# Patient Record
Sex: MALE | Race: WHITE | NOT HISPANIC OR LATINO | URBAN - METROPOLITAN AREA
[De-identification: names, ages, dates, MRNs, and addresses within clinical notes are randomized per-mention and may not be internally consistent; named-entity substitution may affect disease eponyms.]

---

## 2017-01-02 DIAGNOSIS — F43.10 PTSD (POST-TRAUMATIC STRESS DISORDER): Primary | ICD-10-CM

## 2017-01-02 RX ORDER — ZOLPIDEM TARTRATE 10 MG/1
5-10 TABLET ORAL
Qty: 30 TABLET | Refills: 0 | Status: SHIPPED | OUTPATIENT
Start: 2017-01-02 | End: 2017-01-26

## 2017-01-02 NOTE — TELEPHONE ENCOUNTER
Controlled Substance Refill Request for ambien  Problem List Complete:  No     PROVIDER TO CONSIDER COMPLETION OF PROBLEM LIST AND OVERVIEW/CONTROLLED SUBSTANCE AGREEMENT    Last Written Prescription Date:  12/1/16  Last Fill Quantity: 30,   # refills: 0    Last Office Visit with [facility] primary care provider: 11/10/16    Future Office visit:     Controlled substance agreement on file: No.     Processing:  Staff will hand deliver Rx to on-site pharmacy   checked in past 6 months?  No, route to RN     Jaclyn Britton, Pharmacy [facility]

## 2017-01-26 ENCOUNTER — TELEPHONE (OUTPATIENT)
Dept: FAMILY MEDICINE | Facility: CLINIC | Age: 47
End: 2017-01-26

## 2017-01-26 DIAGNOSIS — F43.10 PTSD (POST-TRAUMATIC STRESS DISORDER): Primary | ICD-10-CM

## 2017-01-26 DIAGNOSIS — M54.50 LEFT-SIDED LOW BACK PAIN WITHOUT SCIATICA, UNSPECIFIED CHRONICITY: Primary | ICD-10-CM

## 2017-01-26 DIAGNOSIS — G47.00 INSOMNIA: ICD-10-CM

## 2017-01-26 RX ORDER — CYCLOBENZAPRINE HCL 10 MG
5-10 TABLET ORAL 3 TIMES DAILY PRN
Qty: 30 TABLET | Refills: 1 | Status: SHIPPED | OUTPATIENT
Start: 2017-01-26 | End: 2017-03-10

## 2017-01-26 RX ORDER — ZOLPIDEM TARTRATE 10 MG/1
5-10 TABLET ORAL AT BEDTIME
Qty: 30 TABLET | Refills: 0 | Status: SHIPPED | OUTPATIENT
Start: 2017-01-26 | End: 2017-01-26

## 2017-01-26 RX ORDER — ZOLPIDEM TARTRATE 10 MG/1
5-10 TABLET ORAL
Qty: 30 TABLET | Refills: 0 | Status: SHIPPED | OUTPATIENT
Start: 2017-01-26 | End: 2017-03-10

## 2017-01-26 NOTE — TELEPHONE ENCOUNTER
Flexeril 10 mg      Last Written Prescription Date:  12/22/2016  Last Fill Quantity: 30,   # refills: 1  Last Office Visit with [facility] prescribing provider: 11/10/2016  Future Office visit:       Routing refill request to provider for review/approval because:  Drug not on the [facility] refill protocol or controlled substance                Thank You,  Rufus Ulloa, [facility]  On behalf of [facility]

## 2017-01-26 NOTE — TELEPHONE ENCOUNTER
Let's try a PA. We can call patient and see if he has tried taking 1/2 tab prn at night. If not, let's hold the PA and have him try this instead. If he has tried this and failed, the reason for the PA would be due to inability to tolerated 5 mg nightly.     Also refilled and in outbox.      -Jamie Oropeza, PAC

## 2017-01-26 NOTE — TELEPHONE ENCOUNTER
We called in PA.   Per insurance PA needed either way.  Pt prefers 1 tab.   PA approved for 6 months. 1/27/17 - 7/26/17  Effective date:  1/27/17 - 7/26/17  On site pharmacy notified. They will call patient.   Favio Brannon RN

## 2017-01-26 NOTE — TELEPHONE ENCOUNTER
Pt's insurance requires a PA for zolpidem; they will only cover qty 15 per 25 days; he is wondering if you would do a PA for one tab per day    PA NEEDED ON: zolpidem  INS IS: [facility]  Bin: [id number]  PHONE # IS: [phone]  ID # IS: [id number]  Group: [id number]  Pcn: [id number]  PLEASE LET US KNOW WHEN PA IS GRANTED/DENIED.  THANK YOU!  Jaclyn Britton, Pharmacy [facility]

## 2017-03-09 DIAGNOSIS — F43.10 PTSD (POST-TRAUMATIC STRESS DISORDER): ICD-10-CM

## 2017-03-09 RX ORDER — ZOLPIDEM TARTRATE 10 MG/1
5-10 TABLET ORAL
Qty: 30 TABLET | Refills: 0 | Status: CANCELLED | OUTPATIENT
Start: 2017-03-09

## 2017-03-10 ENCOUNTER — OFFICE VISIT (OUTPATIENT)
Dept: FAMILY MEDICINE | Facility: CLINIC | Age: 47
End: 2017-03-10
Payer: COMMERCIAL

## 2017-03-10 VITALS
HEART RATE: 88 BPM | HEIGHT: 72 IN | TEMPERATURE: 98.2 F | BODY MASS INDEX: 31.83 KG/M2 | WEIGHT: 235 LBS | RESPIRATION RATE: 16 BRPM | DIASTOLIC BLOOD PRESSURE: 85 MMHG | SYSTOLIC BLOOD PRESSURE: 138 MMHG

## 2017-03-10 DIAGNOSIS — F43.10 PTSD (POST-TRAUMATIC STRESS DISORDER): ICD-10-CM

## 2017-03-10 DIAGNOSIS — Z79.899 CONTROLLED SUBSTANCE AGREEMENT SIGNED: ICD-10-CM

## 2017-03-10 DIAGNOSIS — B08.5 HERPANGINA: Primary | ICD-10-CM

## 2017-03-10 DIAGNOSIS — M54.50 LEFT-SIDED LOW BACK PAIN WITHOUT SCIATICA, UNSPECIFIED CHRONICITY: ICD-10-CM

## 2017-03-10 DIAGNOSIS — B35.1 ONYCHOMYCOSIS: ICD-10-CM

## 2017-03-10 PROCEDURE — 36415 COLL VENOUS BLD VENIPUNCTURE: CPT | Performed by: PHYSICIAN ASSISTANT

## 2017-03-10 PROCEDURE — 80076 HEPATIC FUNCTION PANEL: CPT | Performed by: PHYSICIAN ASSISTANT

## 2017-03-10 PROCEDURE — 99213 OFFICE O/P EST LOW 20 MIN: CPT | Performed by: PHYSICIAN ASSISTANT

## 2017-03-10 RX ORDER — TERBINAFINE HYDROCHLORIDE 250 MG/1
250 TABLET ORAL DAILY
Qty: 90 TABLET | Refills: 0 | Status: SHIPPED | OUTPATIENT
Start: 2017-03-10 | End: 2017-05-15

## 2017-03-10 RX ORDER — ZOLPIDEM TARTRATE 10 MG/1
5-10 TABLET ORAL
Qty: 30 TABLET | Refills: 0 | Status: SHIPPED | OUTPATIENT
Start: 2017-03-10 | End: 2017-05-15

## 2017-03-10 RX ORDER — CYCLOBENZAPRINE HCL 10 MG
5-10 TABLET ORAL 3 TIMES DAILY PRN
Qty: 30 TABLET | Refills: 1 | Status: SHIPPED | OUTPATIENT
Start: 2017-03-10 | End: 2017-05-24

## 2017-03-10 RX ORDER — VALACYCLOVIR HYDROCHLORIDE 1 G/1
2000 TABLET, FILM COATED ORAL 2 TIMES DAILY
Qty: 12 TABLET | Refills: 0 | Status: SHIPPED | OUTPATIENT
Start: 2017-03-10 | End: 2017-05-15

## 2017-03-10 NOTE — LETTER
[facility]    03/10/17    Patient: Shiv Amador  YOB: 1970  Medical Record Number: [id number]                                                                  Controlled Substance Agreement  I understand that my care provider has prescribed controlled substances (narcotics, tranquilizers, and/or stimulants) to help manage my condition(s).  I am taking this medicine to help me function or work.  I know that this is strong medicine.  It could have serious side effects and even cause a dependency on the drug.  If I stop these medicines suddenly, I could have severe withdrawal symptoms.    The risks, benefits, and side effects of these medication(s) were explained to me.  I agree that:  1. I will take part in other treatments as advised by my provider.  This may be psychiatry or counseling, physical therapy, behavioral therapy, group treatment, or a referral to a pain clinic.  I will reduce or stop my medicine when my provider tells me to do so.   2. I will take my medicines as prescribed.  I will not change the dose or schedule unless my provider tells me to.  There will be no refills if I  run out early.   I may be contacted at any time without warning and asked to complete a drug test or pill count.   3. I will keep all my appointments at the clinic.  If I miss appointments or fail to follow instructions, my provider may stop my medicine.  4. I will not ask other providers to prescribe controlled substances. And I will not accept controlled substances from other people. If I need another prescribed controlled substance for a new reason, I will notify my provider within one business day.  5. If I enroll in the [place] Medical Marijuana program, I will tell my provider.  I will also sign an agreement to share my medical records with my provider.  6. I will use one pharmacy to fill all of my controlled substance prescriptions.  If my prescription is mailed to my pharmacy, it may  take 5 to 7 days for my medicine to be ready.  7. I understand that my provider, clinic care team, and pharmacy can track controlled substance prescriptions from other providers through a central database (prescription monitoring program).  8. I will bring in my list of medications (or my medicine bottles) each time I come to the clinic.  REV-  04/2016                                                                                                                                            Page 1 of 2      [facility]    03/10/17    Patient: Shiv Amador  YOB: 1970  Medical Record Number: [id number]    9. Refills of controlled substances will be made only during office hours.  It is up to me to make sure that I do not run out of my medicines on weekends or holidays.    10. I am responsible for my prescriptions.  If the medicine is lost or stolen, it will not be replaced.   I also agree not to share these medicines with anyone.  11. I agree to not use ANY illegal or recreational drugs.  This includes marijuana, cocaine, bath salts or other drugs.  I agree not to use alcohol unless my provider says I may.  I agree to give urine samples whenever asked.  If I fail to give a urine sample, the provider may stop my medicine.     12. I will tell my nurse or provider right away if I become pregnant or have a new medical problem treated outside of [facility].  13. I understand that this medicine can affect my thinking and judgment.  It may be unsafe for me to drive, use machinery and do dangerous tasks.  I will not do any of these things until I know how the medicine affects me.  If my dose changes, I will wait to see how it affects me.  I will contact my provider if I have concerns about medicine side effects.  I understand that if I do not follow any of the conditions above, my prescriptions or treatment may be stopped.    I agree that my provider, clinic care team, and pharmacy may  work with any city, state or federal law enforcement agency that investigates the misuse, sale, or other diversion of my controlled medicine. I will allow my provider to discuss my care with or share a copy of this agreement with any other treating provider, pharmacy or emergency room where I receive care.  I agree to give up (waive) any right of privacy or confidentiality with respect to these authorizations.   I have read this agreement and have asked questions about anything I did not understand.   ___________________________________    ___________________________  Patient Signature                                                           Date and Time  ___________________________________     ____________________________  Witness                                                                            Date and Time  ___________________________________  Tyler Oropeza PA-C  REV-  04/2016                                                                                                                                                                 Page 2 of 2

## 2017-03-10 NOTE — LETTER
[facility]  [address] [place], [address]  [phone]      March 13, 2017    Shiv Amador  [address] [address] [address]          Dear Shiv,    The results of your recent tests were normal.  Enclosed is a copy of the results.  It was a pleasure to see you at your last appointment.    If you have any questions or concerns, please call myself or my nurse at [phone].          Sincerely,    Tyler Oropeza PA-C    Results for orders placed or performed in visit on 03/10/17   Hepatic panel (Albumin, ALT, AST, Bili, Alk Phos, TP)   Result Value Ref Range    Bilirubin Direct 0.2 0.0 - 0.2 mg/dL    Bilirubin Total 0.8 0.2 - 1.3 mg/dL    Albumin 3.5 3.4 - 5.0 g/dL    Protein Total 7.6 6.8 - 8.8 g/dL    Alkaline Phosphatase 49 40 - 150 U/L    ALT 45 0 - 70 U/L    AST 37 0 - 45 U/L     KB

## 2017-03-10 NOTE — MR AVS SNAPSHOT
After Visit Summary   3/10/2017    Shiv Amador    MRN: [id number]           Patient Information     Date Of Birth          1970        Visit Information        Provider Department      3/10/2017 11:15 AM Tyler Oropeza PA-C [facility]        Today's Diagnoses     Herpangina    -  1    Left-sided low back pain without sciatica, unspecified chronicity        PTSD (post-traumatic stress disorder)        Onychomycosis          Care Instructions      Fungal Infection of Nails  Fungal infection of the nails changes the way fingernails and toenails look. They may thicken, discolor, change shape, or split. This condition is hard to treat because nails grow slowly and have limited blood supply. It is common for the infection to come back after treatment.  There are 2 types of medicines used.    Topical anti-fungal medicines are applied to the surface of the skin and nail area. These medicines are not very effective because they cannot get deep into the nail.    Topical medicines are most useful in combination with oral medicines. Oral antifungal medicines work better because they penetrate the nail from the inside out. However, recurrence still occurs and it may take 9 to 12 months to determine if you have been cured or not (i.e., for your nail to look normal again). You can repeat treatment if needed. Medications are well-tolerated and it is uncommon to need to stop therapy due to side effects, but your doctor may perform some monitoring tests. Discuss potential side effects with your doctor before starting treatment.  If medicines fail, the nail can be removed surgically or chemically. This improves the effectiveness of medical treatment because the fungus is physically removed from the body.  Home care  The following will help you care for your infection at home:  1. Use medicines exactly as directed for as long as directed. Treating a fungal infection can take longer  than other kinds of infections.  2. Smoking is a risk factor for fungal infection. This is one more reason to quit.  3. Wear absorbent socks and shoes that let your feet breathe. Sweaty feet increase risk of fungal infection and make an existing infection harder to treat.  4. Use footwear when in damp public places like swimming pools, gyms, and shower rooms. This will help you avoid the fungus that grows there.  5. Don't share nail clippers or scissors with others.  Follow-up care  Follow up with your doctor as directed by our staff.  When to seek medical care  Get prompt medical attention if any of the following occur:    Skin by the nail becomes reddened, swollen, painful, or drains pus    Side effects from oral anti-fungal medicines    3592-6851 [employer]. [address] [place] [address], [place] [address]. All rights reserved. This information is not intended as a substitute for professional medical care. Always follow your healthcare professional's instructions.              Follow-ups after your visit        Who to contact     If you have questions or need follow up information about today's clinic visit or your schedule please contact [facility] directly at [phone].  Normal or non-critical lab and imaging results will be communicated to you by Movatuhart, letter or phone within 4 business days after the clinic has received the results. If you do not hear from us within 7 days, please contact the clinic through Movat[employer]art or phone. If you have a critical or abnormal lab result, we will notify you by phone as soon as possible.  Submit refill requests through TextHog or call your pharmacy and they will forward the refill request to us. Please allow 3 business days for your refill to be completed.          Additional Information About Your Visit        MovatuharSmarp Information     TextHog lets you send messages to your doctor, view your test results, renew your prescriptions, schedule  "appointments and more. To sign up, go to [email or web address] . Click on \"Log in\" on the left side of the screen, which will take you to the Welcome page. Then click on \"Sign up Now\" on the right side of the page.     You will be asked to enter the access code listed below, as well as some personal information. Please follow the directions to create your username and password.     Your access code is: [email or web address]  Expires: 2017 11:41 AM     Your access code will  in 90 days. If you need help or a new code, please call your [place] clinic or [phone].        Care EveryWhere ID     This is your Care EveryWhere ID. This could be used by other organizations to access your [place] medical records  [id number]        Your Vitals Were     Pulse Temperature Respirations Height BMI (Body Mass Index)       88 98.2  F (36.8  C) (Oral) 16 6' (1.829 m) 31.87 kg/m2        Blood Pressure from Last 3 Encounters:   03/10/17 138/85   11/10/16 123/71   16 132/71    Weight from Last 3 Encounters:   03/10/17 235 lb (106.6 kg)   11/10/16 232 lb (105.2 kg)   16 223 lb (101.2 kg)              We Performed the Following     Hepatic panel (Albumin, ALT, AST, Bili, Alk Phos, TP)          Today's Medication Changes          These changes are accurate as of: 3/10/17 11:41 AM.  If you have any questions, ask your nurse or doctor.               Start taking these medicines.        Dose/Directions    terbinafine 250 MG tablet   Commonly known as:  lamISIL   Used for:  Onychomycosis   Started by:  Tyler Oropeza PA-C        Dose:  250 mg   Take 1 tablet (250 mg) by mouth daily   Quantity:  90 tablet   Refills:  0       valACYclovir 1000 mg tablet   Commonly known as:  VALTREX   Used for:  Herpangina   Started by:  Tyler Oropeza PA-C        Dose:  2000 mg   Take 2 tablets (2,000 mg) by mouth 2 times daily for 3 days   Quantity:  12 tablet   Refills:  0            Where to get your medicines    "   These medications were sent to [facility], [place] - [address]  [address] [facility] [address]     Phone:  [phone]     cyclobenzaprine 10 MG tablet    terbinafine 250 MG tablet    valACYclovir 1000 mg tablet         Some of these will need a paper prescription and others can be bought over the counter.  Ask your nurse if you have questions.     Bring a paper prescription for each of these medications     zolpidem 10 MG tablet                Primary Care Provider Office Phone # Fax #    Tyler Cyrus Oropeza PA-C [phone]       [facility] [address] [facility] [address]        Thank you!     Thank you for choosing [facility]  for your care. Our goal is always to provide you with excellent care. Hearing back from our patients is one way we can continue to improve our services. Please take a few minutes to complete the written survey that you may receive in the mail after your visit with us. Thank you!             Your Updated Medication List - Protect others around you: Learn how to safely use, store and throw away your medicines at www.disposemymeds.org.          This list is accurate as of: 3/10/17 11:41 AM.  Always use your most recent med list.                   Brand Name Dispense Instructions for use    cyclobenzaprine 10 MG tablet    FLEXERIL    30 tablet    Take 0.5-1 tablets (5-10 mg) by mouth 3 times daily as needed for muscle spasms       finasteride 1 MG tablet    PROPECIA    90 tablet    Take 1 tablet (1 mg) by mouth daily       terbinafine 250 MG tablet    lamISIL    90 tablet    Take 1 tablet (250 mg) by mouth daily       valACYclovir 1000 mg tablet    VALTREX    12 tablet    Take 2 tablets (2,000 mg) by mouth 2 times daily for 3 days       zolpidem 10 MG tablet    AMBIEN    30 tablet    Take 0.5-1 tablets (5-10 mg) by mouth nightly as needed for sleep

## 2017-03-10 NOTE — NURSING NOTE
Chief Complaint   Patient presents with     Derm Problem       Initial /85 (BP Location: Right arm, Patient Position: Chair, Cuff Size: Adult Large)  Pulse 88  Temp 98.2  F (36.8  C) (Oral)  Resp 16  Ht 6' (1.829 m)  Wt 235 lb (106.6 kg)  BMI 31.87 kg/m2 Estimated body mass index is 31.87 kg/(m^2) as calculated from the following:    Height as of this encounter: 6' (1.829 m).    Weight as of this encounter: 235 lb (106.6 kg).  Medication Reconciliation: complete

## 2017-03-10 NOTE — PROGRESS NOTES
SUBJECTIVE:                                                    Shiv Amador is a 46 year old male who presents to clinic today for the following health issues:      Rash     Onset: couple days    Description:   Location: lower lip  Character: raised, redness  Itching (Pruritis): no     Progression of Symptoms:  worsening    Accompanying Signs & Symptoms:  Fever: no   Body aches or joint pain: no   Sore throat symptoms: no   Recent cold symptoms: recently got over cold   History:   Previous similar rash: no     Precipitating factors:   Exposure to similar rash: no   New exposures: None   Recent travel: no     -possible fungas from toenail infection which has gotten worse. Attempted ciclopirox, without improvement. Has taken po lamisil in past without side effects, but did not complete course.      Therapies Tried and outcome: vaseline        Of note, patient also requesting refill of Ambien and flexeril. Both tolerated well without side effects.     Problem list and histories reviewed & adjusted, as indicated.  Additional history: as documented    Patient Active Problem List   Diagnosis     Adjustment disorder with mixed anxiety and depressed mood     Obstructive sleep apnea     Sciatica     CARDIOVASCULAR SCREENING; LDL GOAL LESS THAN 160     Elevated LFTs     PTSD (post-traumatic stress disorder)     Past Surgical History   Procedure Laterality Date     Hc knee scope, w/lateral release         Social History   Substance Use Topics     Smoking status: Never Smoker     Smokeless tobacco: Never Used      Comment: 2-5 cig every couple     Alcohol use No     Family History   Problem Relation Age of Onset     HEART DISEASE Father      heart attack  age 67     Respiratory Father      emphyzema     Family History Negative Sister      Family History Negative Sister      Family History Negative Brother          Current Outpatient Prescriptions   Medication Sig Dispense Refill     valACYclovir (VALTREX) 1000 mg  tablet Take 2 tablets (2,000 mg) by mouth 2 times daily for 3 days 12 tablet 0     cyclobenzaprine (FLEXERIL) 10 MG tablet Take 0.5-1 tablets (5-10 mg) by mouth 3 times daily as needed for muscle spasms 30 tablet 1     zolpidem (AMBIEN) 10 MG tablet Take 0.5-1 tablets (5-10 mg) by mouth nightly as needed for sleep 30 tablet 0     terbinafine (LAMISIL) 250 MG tablet Take 1 tablet (250 mg) by mouth daily 90 tablet 0     finasteride (PROPECIA) 1 MG tablet Take 1 tablet (1 mg) by mouth daily 90 tablet 1     No Known Allergies  BP Readings from Last 3 Encounters:   03/10/17 138/85   11/10/16 123/71   11/04/16 132/71    Wt Readings from Last 3 Encounters:   03/10/17 235 lb (106.6 kg)   11/10/16 232 lb (105.2 kg)   11/04/16 223 lb (101.2 kg)                    Reviewed and updated as needed this visit by clinical staff  Tobacco  Allergies  Meds  Problems  Med Hx  Surg Hx  Fam Hx  Soc Hx        Reviewed and updated as needed this visit by Provider  Allergies  Meds  Problems         ROS:  Constitutional, HEENT, skin,  Psych, neuro, cardiovascular, pulmonary, gi and gu systems are negative, except as otherwise noted.    OBJECTIVE:                                                    /85 (BP Location: Right arm, Patient Position: Chair, Cuff Size: Adult Large)  Pulse 88  Temp 98.2  F (36.8  C) (Oral)  Resp 16  Ht 6' (1.829 m)  Wt 235 lb (106.6 kg)  BMI 31.87 kg/m2  Body mass index is 31.87 kg/(m^2).  GENERAL: healthy, alert and no distress  HENT: normal cephalic/atraumatic, oropharynx clear, oral mucous membranes moist and ~4 papular lesion noted on bottom lip. No drainage or erythema. No tenderness to palpation.   SKIN: severe onychomycosis noted on left great toe with significant flaking.   PSYCH: mentation appears normal, affect normal/bright    Diagnostic Test Results:  none      ASSESSMENT/PLAN:                                                      (B08.5) Herpangina  (primary encounter diagnosis)  Comment:  evident on exam. Felt likely secondary to decreased immune system from recent illness. Will tx with antivirals given worsening course. If no improvement in 3 days, patient to RTC. Sooner if worsening.   Plan: valACYclovir (VALTREX) 1000 mg tablet            (B35.1) Onychomycosis  Comment: evident on exam. Failed ciclopirox. Will attempt PO once more given this not being completed last time. LFTs have been mildly elevated in past. Will recheck these as well. Follow up in 3 months prn. If no improvement, will have see podiatry.   Plan: terbinafine (LAMISIL) 250 MG tablet, Hepatic         panel (Albumin, ALT, AST, Bili, Alk Phos, TP)        -Medication use and side effects discussed with the patient. Patient is in complete understanding and agreement with plan.       (M54.5) Left-sided low back pain without sciatica, unspecified chronicity  Comment: stable   Plan: cyclobenzaprine (FLEXERIL) 10 MG tablet            (F43.10) PTSD (post-traumatic stress disorder)  Comment: stable. Refilled today. Updated problem list and obtained signed agreement.   Plan: zolpidem (AMBIEN) 10 MG tablet        -Medication use and side effects discussed with the patient. Patient is in complete understanding and agreement with plan.         Follow up: as above     Tyler Oropeza PA-C  [facility]

## 2017-03-10 NOTE — PATIENT INSTRUCTIONS
Fungal Infection of Nails  Fungal infection of the nails changes the way fingernails and toenails look. They may thicken, discolor, change shape, or split. This condition is hard to treat because nails grow slowly and have limited blood supply. It is common for the infection to come back after treatment.  There are 2 types of medicines used.    Topical anti-fungal medicines are applied to the surface of the skin and nail area. These medicines are not very effective because they cannot get deep into the nail.    Topical medicines are most useful in combination with oral medicines. Oral antifungal medicines work better because they penetrate the nail from the inside out. However, recurrence still occurs and it may take 9 to 12 months to determine if you have been cured or not (i.e., for your nail to look normal again). You can repeat treatment if needed. Medications are well-tolerated and it is uncommon to need to stop therapy due to side effects, but your doctor may perform some monitoring tests. Discuss potential side effects with your doctor before starting treatment.  If medicines fail, the nail can be removed surgically or chemically. This improves the effectiveness of medical treatment because the fungus is physically removed from the body.  Home care  The following will help you care for your infection at home:  1. Use medicines exactly as directed for as long as directed. Treating a fungal infection can take longer than other kinds of infections.  2. Smoking is a risk factor for fungal infection. This is one more reason to quit.  3. Wear absorbent socks and shoes that let your feet breathe. Sweaty feet increase risk of fungal infection and make an existing infection harder to treat.  4. Use footwear when in damp public places like swimming pools, gyms, and shower rooms. This will help you avoid the fungus that grows there.  5. Don't share nail clippers or scissors with others.  Follow-up care  Follow up with  your doctor as directed by our staff.  When to seek medical care  Get prompt medical attention if any of the following occur:    Skin by the nail becomes reddened, swollen, painful, or drains pus    Side effects from oral anti-fungal medicines    0925-6816 [employer]. [address] [place] [address], [place] [address]. All rights reserved. This information is not intended as a substitute for professional medical care. Always follow your healthcare professional's instructions.

## 2017-03-10 NOTE — TELEPHONE ENCOUNTER
Controlled Substance Refill Request for ambien  Problem List Complete:  No     PROVIDER TO CONSIDER COMPLETION OF PROBLEM LIST AND OVERVIEW/CONTROLLED SUBSTANCE AGREEMENT    Last Written Prescription Date:  1/27/17  Last Fill Quantity: 30,   # refills: 0    Last Office Visit with [facility] care provider: 11/10/16    Future Office visit:   Next 5 appointments (look out 90 days)     Mar 10, 2017 11:15 AM CST   SHORT with Tyler Oropeza PA-C   [facility]    [address] [place] [id number] [phone]                  Controlled substance agreement on file: Yes:  Date 8/21/2013.     Processing:  Staff will hand deliver Rx to on-site pharmacy   checked in past 6 months?  No, route to RN     Jaclyn Britton, Pharmacy [facility]

## 2017-03-11 LAB
ALBUMIN SERPL-MCNC: 3.5 G/DL (ref 3.4–5)
ALP SERPL-CCNC: 49 U/L (ref 40–150)
ALT SERPL W P-5'-P-CCNC: 45 U/L (ref 0–70)
AST SERPL W P-5'-P-CCNC: 37 U/L (ref 0–45)
BILIRUB DIRECT SERPL-MCNC: 0.2 MG/DL (ref 0–0.2)
BILIRUB SERPL-MCNC: 0.8 MG/DL (ref 0.2–1.3)
PROT SERPL-MCNC: 7.6 G/DL (ref 6.8–8.8)

## 2017-05-01 ENCOUNTER — TELEPHONE (OUTPATIENT)
Dept: FAMILY MEDICINE | Facility: CLINIC | Age: 47
End: 2017-05-01

## 2017-05-01 DIAGNOSIS — F43.23 ADJUSTMENT DISORDER WITH MIXED ANXIETY AND DEPRESSED MOOD: Primary | ICD-10-CM

## 2017-05-01 NOTE — TELEPHONE ENCOUNTER
Reason for call: Other      [facility] Officer [facility] Officer   is requesting assistance from Jamie Oropeza regarding   patients use of certain medications.    Additional comments: Please have Jamie review documents from [facility]   and fill out anything necessary.    Phone Number Pt can be reached at: Other phone number:  [phone]  Best Time: Anytime  Can we leave a detailed message on this number? YES

## 2017-05-02 NOTE — TELEPHONE ENCOUNTER
I am unsure what they are asking? Do they just need the med list? If this is it, it looks fine. Also I see patient sees a psychiatrist for which he obtains other medications listed on letter. [facility] may need to obtain notes from them and also our medlist will need to be updated and a call to the patient to do so can be done.     Thanks,    Jamie Oropeza, PAC

## 2017-05-10 NOTE — TELEPHONE ENCOUNTER
Please send letter to patient and close encounter due to multiple attempts.     -Jamie Oropeza, PAC

## 2017-05-11 ENCOUNTER — TELEPHONE (OUTPATIENT)
Dept: FAMILY MEDICINE | Facility: CLINIC | Age: 47
End: 2017-05-11

## 2017-05-11 NOTE — TELEPHONE ENCOUNTER
Patient had previous prescription of gabapentin from Dr. Charles Carlson that is no longer on his med list in [place].  Patient requested a new prescription for this.  If approved, please send a new prescription for gabapentin.  Unsure of dosage.    Thanks,  Richelle Schilling Mariza  [facility]  [phone]

## 2017-05-11 NOTE — TELEPHONE ENCOUNTER
I left a message for Zulma with [facility] stating Delta needs to contact Shiv guillen before the records can be released.    The signatures do not match.  Form Back at the [facility]

## 2017-05-11 NOTE — TELEPHONE ENCOUNTER
We have been attempting to contact patient for multiple days about med list. He has not returned our calls. I believe Dr. Wilcox is his psychiatrist. He is also obtaining other medications through him. Please call to verify med list and also refill request should go to his psychiatrist.     Thanks,    Jamie Oropeza, PAC

## 2017-05-12 NOTE — TELEPHONE ENCOUNTER
Pt returned call, updated correct contact number    Reviewed medication list with pt  [facility], [facility]  Had rx for Lamisol, but admits doesn't take it as he doesn't find it effective  Has used Valtrex in past if needed    States he gets other meds from psychiatrist which include Gabapentin, Adderall, Clonazepam    Route to provider to review and advise    Makenna RN Nurse Triage

## 2017-05-15 DIAGNOSIS — F43.10 PTSD (POST-TRAUMATIC STRESS DISORDER): ICD-10-CM

## 2017-05-15 RX ORDER — DEXTROAMPHETAMINE SACCHARATE, AMPHETAMINE ASPARTATE, DEXTROAMPHETAMINE SULFATE AND AMPHETAMINE SULFATE 5; 5; 5; 5 MG/1; MG/1; MG/1; MG/1
20 TABLET ORAL DAILY
Refills: 0 | COMMUNITY
Start: 2017-05-15 | End: 2018-08-24

## 2017-05-15 NOTE — TELEPHONE ENCOUNTER
Controlled Substance Refill Request for Ambien  Problem List Complete:  Yes    Controlled Substance Refill Request for Ambien     Last refill: 3/10/17    Last clinic visit: 3/10/17     Clinic visit frequency required: Q 6  months  Next appt: TBD    Controlled substance agreement on file: Yes:  Date 3/10/17.    Documentation in problem list reviewed:  Yes    Processing:  Staff will hand deliver Rx to on-site pharmacy    RX monitoring [place] ([place] reviewed:  reviewed- no concerns  [place]:  [email or web address]       checked in past 6 months?  No, route to RN Overdue    Jaclyn Britton, Pharmacy [facility]

## 2017-05-15 NOTE — TELEPHONE ENCOUNTER
Called and updated med list in pts chart. Patient did not know some of the doses for the meds.  Patient states he did sign CIERRA for us to send records to [facility] and gave another verbal ok for us to send med list and problem list to them.   CIERRA and letter sent to abstracting.  Letter, med list and problem list faxed to [place].

## 2017-05-15 NOTE — TELEPHONE ENCOUNTER
Called and spoke with patient. Patient states he did sign CIERRA for us to send records to [facility].   Patient gave another verbal ok to send med list and problem list to them. CIERRA and letter sent to abstract.

## 2017-05-17 RX ORDER — ZOLPIDEM TARTRATE 10 MG/1
5-10 TABLET ORAL
Qty: 30 TABLET | Refills: 0 | Status: SHIPPED | OUTPATIENT
Start: 2017-05-17 | End: 2017-12-14

## 2017-05-24 DIAGNOSIS — M54.50 LEFT-SIDED LOW BACK PAIN WITHOUT SCIATICA, UNSPECIFIED CHRONICITY: ICD-10-CM

## 2017-05-25 NOTE — TELEPHONE ENCOUNTER
CYCLOBENZAPRINE HCL 10MG TABS        Last Written Prescription Date: 03-  Last Fill Quantity: 05-,  # 30refills: 1   Last Office Visit with [facility], [facility] or [facility] prescribing provider: 03- Tyler Oropeza.

## 2017-05-26 RX ORDER — CYCLOBENZAPRINE HCL 10 MG
TABLET ORAL
Qty: 30 TABLET | Refills: 1 | Status: SHIPPED | OUTPATIENT
Start: 2017-05-26 | End: 2017-07-11

## 2017-07-03 DIAGNOSIS — L65.9 ALOPECIA: ICD-10-CM

## 2017-07-03 NOTE — TELEPHONE ENCOUNTER
finasteride (PROPECIA) 1 MG tablet    Last Written Prescription Date: 11/04/2016  Last Fill Quantity: 90,05/11/2017  # refills: 1   Last Office Visit with [facility], [facility] or [facility] prescribing provider: 03/10/2017-Jamie Oropeza

## 2017-07-05 NOTE — TELEPHONE ENCOUNTER
Patient called and states he is out of his medication how soon will this be completed? Please call patient back.    Vicky Almaguer Pat. Rep

## 2017-07-06 ENCOUNTER — OFFICE VISIT (OUTPATIENT)
Dept: FAMILY MEDICINE | Facility: CLINIC | Age: 47
End: 2017-07-06
Payer: COMMERCIAL

## 2017-07-06 VITALS
RESPIRATION RATE: 12 BRPM | DIASTOLIC BLOOD PRESSURE: 74 MMHG | OXYGEN SATURATION: 97 % | TEMPERATURE: 98 F | WEIGHT: 212.7 LBS | HEART RATE: 102 BPM | BODY MASS INDEX: 28.81 KG/M2 | HEIGHT: 72 IN | SYSTOLIC BLOOD PRESSURE: 114 MMHG

## 2017-07-06 DIAGNOSIS — M54.50 ACUTE MIDLINE LOW BACK PAIN WITHOUT SCIATICA: Primary | ICD-10-CM

## 2017-07-06 PROCEDURE — 99214 OFFICE O/P EST MOD 30 MIN: CPT | Performed by: FAMILY MEDICINE

## 2017-07-06 RX ORDER — FINASTERIDE 1 MG/1
1 TABLET, FILM COATED ORAL DAILY
Qty: 30 TABLET | Refills: 5 | Status: SHIPPED | OUTPATIENT
Start: 2017-07-06 | End: 2018-08-24

## 2017-07-06 NOTE — NURSING NOTE
Chief Complaint   Patient presents with     Back Pain       Initial /74 (BP Location: Left arm, Patient Position: Standing, Cuff Size: Adult Large)  Pulse 102  Temp 98  F (36.7  C) (Oral)  Resp 12  Ht 6' (1.829 m)  Wt 212 lb 11.2 oz (96.5 kg)  SpO2 97%  BMI 28.85 kg/m2 Estimated body mass index is 28.85 kg/(m^2) as calculated from the following:    Height as of this encounter: 6' (1.829 m).    Weight as of this encounter: 212 lb 11.2 oz (96.5 kg).  Medication Reconciliation: complete   Diego Sims CMA

## 2017-07-06 NOTE — PROGRESS NOTES
SUBJECTIVE:                                                    Shiv Amador is a 46 year old male who presents to clinic today for the following health issues:      Back Pain       Duration: 1 hour ago        Specific cause: lifting    Description:   Location of pain: low back both and middle of back both  Character of pain: sharp and constant  Pain radiation:none  New numbness or weakness in legs, not attributed to pain:  no     Intensity: Currently 8/10    History:   Pain interferes with job: YES  History of back problems: previous herniated disc  Any previous MRI or X-rays: None  Sees a specialist for back pain:  No  Therapies tried without relief: nothing    Alleviating factors:   Improved by: rest      Precipitating factors:  Worsened by: Bending    Functional and Psychosocial Screen (Dulce STarT Back):      Not performed today          Accompanying Signs & Symptoms:  Risk of Fracture:  Recent history of trauma or blunt force  Risk of Cauda Equina:  None  Risk of Infection:  None  Risk of Cancer:  None  Risk of Ankylosing Spondylitis:  Onset at age <35, male, AND morning back stiffness. no               Acute midline low back pain without sciatica  (primary encounter diagnosis): Patient states the injury occurred an hour and a half ago (about 1 PM) when he was squatting 350 lbs at the gym. He describes it as pain in the middle back. He has a history of back pain including sciatica and previous back injuries, first Dx in our record > 10 years ago. On prn flexeril     Problem list and histories reviewed & adjusted, as indicated.  Additional history: as documented        Reviewed and updated as needed this visit by clinical staff        Past Medical History:   Diagnosis Date     JOAN (obstructive sleep apnea)      PTSD (post-traumatic stress disorder)      Recurrent low back pain        Past Surgical History:   Procedure Laterality Date     HC KNEE SCOPE, W/LATERAL RELEASE         Family History   Problem  Relation Age of Onset     HEART DISEASE Father      heart attack  age 67     Respiratory Father      emphyzema     Family History Negative Sister      Family History Negative Sister      Family History Negative Brother        Social History   Substance Use Topics     Smoking status: Never Smoker     Smokeless tobacco: Never Used      Comment: 2-5 cig every couple     Alcohol use No       Reviewed and updated as needed this visit by Provider         ROS:  POSITIVE for Lower back pain. Will not bend. CAn walk. No GI symptoms, fall    This document serves as a record of the services and decisions personally performed and made by Dk Britt MD. It was created on his behalf by Cami Trent, a trained medical scribe.  The creation of this document is based on the scribe's personal observations and the provider's statements to the medical scribe.  Cami Trent, 2017 2:31 PM    OBJECTIVE:     /74 (BP Location: Left arm, Patient Position: Standing, Cuff Size: Adult Large)  Pulse 102  Temp 98  F (36.7  C) (Oral)  Resp 12  Ht 1.829 m (6')  Wt 96.5 kg (212 lb 11.2 oz)  SpO2 97%  BMI 28.85 kg/m2  Body mass index is 28.85 kg/(m^2).    EXAM:  GENERAL: Prominent pain behaviors  MS: Unable to stand on toes, refused to touch toes, gait symmetric  ponderous      ASSESSMENT/PLAN:     ASSESSMENT / PLAN:  (M54.5) Acute midline low back pain without sciatica  (primary encounter diagnosis)  Comment: Refer to medical spinal specialist.  Weight lifting is extreme, History is long, narcotics in past  Plan: ORTHO  REFERRAL, nabumetone (RELAFEN)         750 MG tablet                      The information in this document, created by the medical scribe for me, accurately reflects the services I personally performed and the decisions made by me. I have reviewed and approved this document for accuracy prior to leaving the patient care area.  Dk Britt MD 2017 2:31 PM    Dk Britt MD  [place]  [facility]

## 2017-07-06 NOTE — MR AVS SNAPSHOT
After Visit Summary   7/6/2017    Shiv Amador    MRN: [id number]           Patient Information     Date Of Birth          1970        Visit Information        Provider Department      7/6/2017 2:15 PM Dk Britt MD [facility]        Today's Diagnoses     Acute midline low back pain without sciatica    -  1       Follow-ups after your visit        Additional Services     ORTHO  REFERRAL       [facility] is referring you to the Orthopedic  Services at [facility].       The  Representative will assist you in the coordination of your Orthopedic and Musculoskeletal Care as prescribed by your physician.    The  Representative will call you within 1 business day to help schedule your appointment, or you may contact the  Representative at:    All areas ~ [phone]     Type of Referral : Spine: Lumbar  **Choose Medical Spine Specialist (unless patient was seen by a Medical Spine Specialist within the past 6 months).**  Surgical Evaluation is advised if the patient presents with one or more of the following red flags: Evidence of Spinal Tumor, Infection or Fracture, Cauda Equina Syndrome, Sudden or Progressive Weakness, Loss of Bowel or Bladder Control, or any other documented emergent neurological condition resulting from a Lumbar Spinal Condition. Medical Spine Specialist        Timeframe requested: 1 - 2 days    Coverage of these services is subject to the terms and limitations of your health insurance plan.  Please call member services at your health plan with any benefit or coverage questions.      If X-rays, CT or MRI's have been performed, please contact the facility where they were done to arrange for , prior to your scheduled appointment.  Please bring this referral request to your appointment and present it to your specialist.                  Your next 10 appointments  "already scheduled     2017  2:00 PM CDT   New Visit with VIBHA Ward CNP   [facility] ([facility])    [address] [place] [id number] [phone]              Who to contact     If you have questions or need follow up information about today's clinic visit or your schedule please contact [facility] directly at [phone].  Normal or non-critical lab and imaging results will be communicated to you by MyChart, letter or phone within 4 business days after the clinic has received the results. If you do not hear from us within 7 days, please contact the clinic through MyChart or phone. If you have a critical or abnormal lab result, we will notify you by phone as soon as possible.  Submit refill requests through [employer] or call your pharmacy and they will forward the refill request to us. Please allow 3 business days for your refill to be completed.          Additional Information About Your Visit        MyCharTwijector Information     [employer] lets you send messages to your doctor, view your test results, renew your prescriptions, schedule appointments and more. To sign up, go to [email or web address] . Click on \"Log in\" on the left side of the screen, which will take you to the Welcome page. Then click on \"Sign up Now\" on the right side of the page.     You will be asked to enter the access code listed below, as well as some personal information. Please follow the directions to create your username and password.     Your access code is: [email or web address]  Expires: 10/4/2017  2:43 PM     Your access code will  in 90 days. If you need help or a new code, please call your [facility] clinic or [phone].        Care EveryWhere ID     This is your Care EveryWhere ID. This could be used by other organizations to access your [facility] medical records  [id number]        Your Vitals Were     Pulse Temperature " Respirations Height Pulse Oximetry BMI (Body Mass Index)    102 98  F (36.7  C) (Oral) 12 6' (1.829 m) 97% 28.85 kg/m2       Blood Pressure from Last 3 Encounters:   07/06/17 114/74   03/10/17 138/85   11/10/16 123/71    Weight from Last 3 Encounters:   07/06/17 212 lb 11.2 oz (96.5 kg)   03/10/17 235 lb (106.6 kg)   11/10/16 232 lb (105.2 kg)              We Performed the Following     ORTHO  REFERRAL          Today's Medication Changes          These changes are accurate as of: 7/6/17  8:59 PM.  If you have any questions, ask your nurse or doctor.               Start taking these medicines.        Dose/Directions    nabumetone 750 MG tablet   Commonly known as:  RELAFEN   Used for:  Acute midline low back pain without sciatica   Started by:  Dk Britt MD        Dose:  750 mg   Take 1 tablet (750 mg) by mouth 2 times daily   Quantity:  60 tablet   Refills:  1            Where to get your medicines      These medications were sent to [facility] [place] [address]     Phone:  [phone]     nabumetone 750 MG tablet                Primary Care Provider Office Phone # Fax #    Tyler Cyrus Oropeza PA-C [phone]       [place] [id number]        Equal Access to Services     MONA CASTELLANOS AH: Hadii aad ku hadasho Soomaali, waaxda luqadaha, qaybta kaalmada adeegyada, waxay marisol chance adesofi luz. So [facility] [phone].    ATENCIÓN: Si habla español, tiene a gaona disposición servicios gratuitos de asistencia lingüística. Maeve al [phone].    We comply with applicable federal civil rights laws and [place] laws. We do not discriminate on the basis of race, color, national origin, age, disability sex, sexual orientation or gender identity.            Thank you!     Thank you for choosing [facility]  for your care. Our goal is always to  provide you with excellent care. Hearing back from our patients is one way we can continue to improve our services. Please take a few minutes to complete the written survey that you may receive in the mail after your visit with us. Thank you!             Your Updated Medication List - Protect others around you: Learn how to safely use, store and throw away your medicines at www.disposemymeds.org.          This list is accurate as of: 7/6/17  8:59 PM.  Always use your most recent med list.                   Brand Name Dispense Instructions for use Diagnosis    ADDERALL 20 MG per tablet   Generic drug:  amphetamine-dextroamphetamine      Take 1 tablet (20 mg) by mouth 2 times daily        CLONAZEPAM PO      Dose unknown        cyclobenzaprine 10 MG tablet    FLEXERIL    30 tablet    TAKE ONE-HALF TO ONE TABLET BY MOUTH THREE TIMES A DAY AS NEEDED FOR MUSCLE SPASMS    Left-sided low back pain without sciatica, unspecified chronicity       finasteride 1 MG tablet    PROPECIA    30 tablet    Take 1 tablet (1 mg) by mouth daily    Alopecia       GABAPENTIN PO      Dose unknown        nabumetone 750 MG tablet    RELAFEN    60 tablet    Take 1 tablet (750 mg) by mouth 2 times daily    Acute midline low back pain without sciatica       SEROQUEL PO      Dose unknown.        zolpidem 10 MG tablet    AMBIEN    30 tablet    Take 0.5-1 tablets (5-10 mg) by mouth nightly as needed for sleep    PTSD (post-traumatic stress disorder)

## 2017-07-07 ENCOUNTER — TELEPHONE (OUTPATIENT)
Dept: FAMILY MEDICINE | Facility: CLINIC | Age: 47
End: 2017-07-07

## 2017-07-07 NOTE — TELEPHONE ENCOUNTER
1 page form ([facility] Continuity of Care Request form) signed/completed/dated and faxed to [phone]. Copy sent to abstract.    Evelyn Thakkar/

## 2017-07-11 DIAGNOSIS — Z79.899 CONTROLLED SUBSTANCE AGREEMENT SIGNED: ICD-10-CM

## 2017-07-11 DIAGNOSIS — M54.50 LEFT-SIDED LOW BACK PAIN WITHOUT SCIATICA, UNSPECIFIED CHRONICITY: ICD-10-CM

## 2017-07-11 NOTE — TELEPHONE ENCOUNTER
CYCLOBENZAPRINE HCL 10MG TABS     Last Written Prescription Date: 05-  Last Fill Quantity: 06-,  #30 refills: 1   Last Office Visit with [facility], P or [facility] prescribing provider: 07- Dr. Britt.

## 2017-07-12 RX ORDER — CYCLOBENZAPRINE HCL 10 MG
TABLET ORAL
Qty: 30 TABLET | Refills: 1 | Status: SHIPPED | OUTPATIENT
Start: 2017-07-12 | End: 2017-09-02

## 2017-07-12 NOTE — TELEPHONE ENCOUNTER
Routing refill request to provider for review/approval because:  Drug not on the [facility] refill protocol   Controlled Substance Refill Request for Ambien     Last refill: 3/10/17    Last clinic visit: 3/10/17     Clinic visit frequency required: Q 6  months  Next appt: TBD    Controlled substance agreement on file: Yes:  Date 3/10/17.    Documentation in problem list reviewed:  Yes    Processing:  Staff will hand deliver Rx to on-site pharmacy    RX [place] program [place]) reviewed:  reviewed 7.12.17, controlled meds from 3 providers, report on desk  [place]MP profile:  [email or web address]    Zoë Diehl RN, BS  Clinical Nurse Triage.

## 2017-07-21 ENCOUNTER — TELEPHONE (OUTPATIENT)
Dept: FAMILY MEDICINE | Facility: CLINIC | Age: 47
End: 2017-07-21

## 2017-07-21 ENCOUNTER — OFFICE VISIT (OUTPATIENT)
Dept: FAMILY MEDICINE | Facility: CLINIC | Age: 47
End: 2017-07-21
Payer: COMMERCIAL

## 2017-07-21 VITALS
SYSTOLIC BLOOD PRESSURE: 118 MMHG | WEIGHT: 214 LBS | BODY MASS INDEX: 29.02 KG/M2 | TEMPERATURE: 98.9 F | DIASTOLIC BLOOD PRESSURE: 73 MMHG | HEART RATE: 71 BPM

## 2017-07-21 DIAGNOSIS — J03.90 TONSILLITIS: ICD-10-CM

## 2017-07-21 DIAGNOSIS — J34.2 DEVIATED NASAL SEPTUM: ICD-10-CM

## 2017-07-21 DIAGNOSIS — Z01.818 PREOP GENERAL PHYSICAL EXAM: Primary | ICD-10-CM

## 2017-07-21 LAB
DEPRECATED S PYO AG THROAT QL EIA: NORMAL
HGB BLD-MCNC: 13.9 G/DL (ref 13.3–17.7)
MICRO REPORT STATUS: NORMAL
SPECIMEN SOURCE: NORMAL

## 2017-07-21 PROCEDURE — 99214 OFFICE O/P EST MOD 30 MIN: CPT | Performed by: PHYSICIAN ASSISTANT

## 2017-07-21 PROCEDURE — 36415 COLL VENOUS BLD VENIPUNCTURE: CPT | Performed by: PHYSICIAN ASSISTANT

## 2017-07-21 PROCEDURE — 87880 STREP A ASSAY W/OPTIC: CPT | Performed by: PHYSICIAN ASSISTANT

## 2017-07-21 PROCEDURE — 85018 HEMOGLOBIN: CPT | Performed by: PHYSICIAN ASSISTANT

## 2017-07-21 PROCEDURE — 87081 CULTURE SCREEN ONLY: CPT | Performed by: PHYSICIAN ASSISTANT

## 2017-07-21 NOTE — PROGRESS NOTES
[facility]  [address] [address]  [phone]  Dept: [phone]    PRE-OP EVALUATION:  Today's date: 2017    Shiv Amador (: 1970) presents for pre-operative evaluation assessment as requested by unknown.  He requires evaluation and anesthesia risk assessment prior to undergoing surgery/procedure for treatment of  .  Proposed procedure: septoplasty    Date of Surgery/ Procedure: 17  Time of Surgery/ Procedure: 730  Hospital/Surgical Facility: [facility]  Fax number for surgical facility: [phone]   Primary Physician: Tyler Oropeza  Type of Anesthesia Anticipated: to be determined    Patient has a Health Care Directive or Living Will:  NO    1. NO - Do you have a history of heart attack, stroke, stent, bypass or surgery on an artery in the head, neck, heart or legs?  2. NO - Do you ever have any pain or discomfort in your chest?  3. NO - Do you have a history of  Heart Failure?  4. NO - Are you troubled by shortness of breath when: walking on the level, up a slight hill or at night?  5. NO - Do you currently have a cold, bronchitis or other respiratory infection?  6. NO - Do you have a cough, shortness of breath or wheezing?  7. NO - Do you sometimes get pains in the calves of your legs when you walk?  8. NO - Do you or anyone in your family have previous history of blood clots?  9. NO - Do you or does anyone in your family have a serious bleeding problem such as prolonged bleeding following surgeries or cuts?  10. NO - Have you ever had problems with anemia or been told to take iron pills?  11. NO - Have you had any abnormal blood loss such as black, tarry or bloody stools, or abnormal vaginal bleeding?  12. NO - Have you ever had a blood transfusion?  13. NO - Have you or any of your relatives ever had problems with anesthesia?  14. YES - DO YOU HAVE SLEEP APNEA, EXCESSIVE SNORING OR DAYTIME DROWSINESS? Sleep apnea  and daytime drowsiness  15. NO - Do you have any prosthetic heart valves?  16. NO - Do you have prosthetic joints?  17. NO - Is there any chance that you may be pregnant?        HPI:                                                      Brief HPI related to upcoming procedure: history of septal deviation resulting in JOAN. The above procedure was deemed the next best step in management.       See problem list for active medical problems.  Problems all longstanding and stable, except as noted/documented.  See ROS for pertinent symptoms related to these conditions.                                                                                                  .    MEDICAL HISTORY:                                                    Patient Active Problem List    Diagnosis Date Noted     Deviated nasal septum 07/21/2017     Priority: Medium     Acute midline low back pain without sciatica 07/06/2017     Priority: Medium     Controlled substance agreement signed 03/10/2017     Priority: Medium     Controlled Substance Refill Request for Ambien     Last refill: 3/10/17    Last clinic visit: 3/10/17     Clinic visit frequency required: Q 6  months  Next appt: TBD    Controlled substance agreement on file: Yes:  Date 3/10/17.    Documentation in problem list reviewed:  Yes    Processing:  Staff will hand deliver Rx to on-site pharmacy    RX monitoring program ([place]MP) reviewed:  reviewed 7.12.17, controlled meds from 3 providers  [place] profile:  [email or web address]           PTSD (post-traumatic stress disorder) 04/15/2016     Priority: Medium     Elevated LFTs 08/05/2011     Priority: Medium     CARDIOVASCULAR SCREENING; LDL GOAL LESS THAN 160 10/31/2010     Priority: Medium     Sciatica 05/22/2007     Priority: Medium     Adjustment disorder with mixed anxiety and depressed mood 03/12/2007     Priority: Medium     Obstructive sleep apnea 03/12/2007     Priority: Medium     Problem list name updated by automated  process. Provider to review        Past Medical History:   Diagnosis Date     Acute midline low back pain without sciatica 7/6/2017     Adjustment disorder with mixed anxiety and depressed mood 3/12/2007     Elevated LFTs 8/5/2011     JOAN (obstructive sleep apnea)      PTSD (post-traumatic stress disorder)      Recurrent low back pain      Past Surgical History:   Procedure Laterality Date     HC KNEE SCOPE, W/LATERAL RELEASE       Current Outpatient Prescriptions   Medication Sig Dispense Refill     cyclobenzaprine (FLEXERIL) 10 MG tablet TAKE ONE-HALF TO ONE TABLET BY MOUTH THREE TIMES A DAY AS NEEDED FOR MUSCLE SPASMS 30 tablet 1     finasteride (PROPECIA) 1 MG tablet Take 1 tablet (1 mg) by mouth daily 30 tablet 5     nabumetone (RELAFEN) 750 MG tablet Take 1 tablet (750 mg) by mouth 2 times daily 60 tablet 1     zolpidem (AMBIEN) 10 MG tablet Take 0.5-1 tablets (5-10 mg) by mouth nightly as needed for sleep 30 tablet 0     amphetamine-dextroamphetamine (ADDERALL) 20 MG per tablet Take 1 tablet (20 mg) by mouth 2 times daily  0     GABAPENTIN PO Dose unknown       CLONAZEPAM PO Dose unknown       QUEtiapine Fumarate (SEROQUEL PO) Dose unknown.       OTC products: no recent use of OTC ASA, NSAIDS or Steroids    No Known Allergies   Latex Allergy: NO    Social History   Substance Use Topics     Smoking status: Never Smoker     Smokeless tobacco: Never Used      Comment: 2-5 cig every couple     Alcohol use No     History   Drug Use No       REVIEW OF SYSTEMS:                                                    C: NEGATIVE for fever, chills, change in weight  I: NEGATIVE for worrisome rashes, moles or lesions  E: NEGATIVE for vision changes or irritation  E/M: mild dry throat. Otherwise, NEGATIVE for ear, mouth and throat problems  R: NEGATIVE for significant cough or SOB  B: NEGATIVE for masses, tenderness or discharge  CV: NEGATIVE for chest pain, palpitations or peripheral edema  GI: NEGATIVE for nausea,  abdominal pain, heartburn, or change in bowel habits  : NEGATIVE for frequency, dysuria, or hematuria  M: NEGATIVE for significant arthralgias or myalgia  N: NEGATIVE for weakness, dizziness or paresthesias  E: NEGATIVE for temperature intolerance, skin/hair changes  H: NEGATIVE for bleeding problems  P: NEGATIVE for changes in mood or affect    EXAM:                                                    /73 (BP Location: Right arm, Patient Position: Chair, Cuff Size: Adult Large)  Pulse 71  Temp 98.9  F (37.2  C) (Oral)  Wt 214 lb (97.1 kg)  BMI 29.02 kg/m2    GENERAL APPEARANCE: healthy, alert and no distress     EYES: EOMI,  PERRL     HENT: Erythematous oropharynx with 1+ tonsillitis. Otherwise, ear canals and TM's normal and nose and mouth without ulcers or lesions     NECK: no adenopathy, no asymmetry, masses, or scars and thyroid normal to palpation     RESP: lungs clear to auscultation - no rales, rhonchi or wheezes     CV: regular rates and rhythm, normal S1 S2, no S3 or S4 and no murmur, click or rub     ABDOMEN:  soft, nontender, no HSM or masses and bowel sounds normal     MS: extremities normal- no gross deformities noted, no evidence of inflammation in joints, FROM in all extremities.     SKIN: no suspicious lesions or rashes     NEURO: Normal strength and tone, sensory exam grossly normal, mentation intact and speech normal     PSYCH: mentation appears normal. and affect normal/bright     LYMPHATICS: normal ant/post cervical, supraclavicular nodes    DIAGNOSTICS:                                                      EKG: Not indicated due to non-vascular surgery and low risk of event (age <65 and without cardiac risk factors)  Labs Resulted Today:   Results for orders placed or performed in visit on 07/21/17   Hemoglobin   Result Value Ref Range    Hemoglobin 13.9 13.3 - 17.7 g/dL   Rapid strep screen   Result Value Ref Range    Specimen Description Throat     Rapid Strep A Screen        NEGATIVE: No Group A streptococcal antigen detected by immunoassay, await   culture report.      Micro Report Status FINAL 07/21/2017        Recent Labs   Lab Test  11/10/16   1322  04/25/16   0819  07/12/11   0828   HGB  13.6  13.1*  13.8   PLT   --   460*  328   NA   --   137  137   POTASSIUM   --   4.9  4.3   CR   --   1.19  1.17   A1C   --    --   4.9        IMPRESSION:                                                    Reason for surgery/procedure: Deviated septum  Diagnosis/reason for consult: preoperative exam for the above procedure.     The proposed surgical procedure is considered INTERMEDIATE risk.    REVISED CARDIAC RISK INDEX  The patient has the following serious cardiovascular risks for perioperative complications such as (MI, PE, VFib and 3  AV Block):  No serious cardiac risks  INTERPRETATION: 0 risks: Class I (very low risk - 0.4% complication rate)    The patient has the following additional risks for perioperative complications:  No identified additional risks      ICD-10-CM    1. Preop general physical exam Z01.818 Hemoglobin   2. Deviated nasal septum J34.2    3. Tonsillitis J03.90 Rapid strep screen     Beta strep group A culture       RECOMMENDATIONS:                                                      NPO after midnight. No alcohol 24 hours prior. No nsaids until after procedure. Tylenol prn for pain. Patient to hold all meds morning of procedure.       APPROVAL GIVEN to proceed with proposed procedure, without further diagnostic evaluation       Signed Electronically by: Tyler Oropeza PA-C    Copy of this evaluation report is provided to requesting physician.    [facility] Preop Guidelines

## 2017-07-21 NOTE — NURSING NOTE
Chief Complaint   Patient presents with     Pre-Op Exam       Initial /73 (BP Location: Right arm, Patient Position: Chair, Cuff Size: Adult Large)  Pulse 71  Temp 98.9  F (37.2  C) (Oral)  Wt 214 lb (97.1 kg)  BMI 29.02 kg/m2 Estimated body mass index is 29.02 kg/(m^2) as calculated from the following:    Height as of 7/6/17: 6' (1.829 m).    Weight as of this encounter: 214 lb (97.1 kg).  Medication Reconciliation: complete        CHARLEE Kwok

## 2017-07-21 NOTE — MR AVS SNAPSHOT
After Visit Summary   7/21/2017    Shiv Amador    MRN: [id number]           Patient Information     Date Of Birth          1970        Visit Information        Provider Department      7/21/2017 11:30 AM Tyler Oropeza PA-C [facility]        Today's Diagnoses     Preop general physical exam    -  1    Deviated nasal septum        Tonsillitis          Care Instructions      Before Your Surgery      Call your surgeon if there is any change in your health. This includes signs of a cold or flu (such as a sore throat, runny nose, cough, rash or fever).    Do not smoke, drink alcohol or take over the counter medicine (unless your surgeon or primary care doctor tells you to) for the 24 hours before and after surgery.    If you take prescribed drugs: Follow your doctor s orders about which medicines to take and which to stop until after surgery.    Eating and drinking prior to surgery: follow the instructions from your surgeon    Take a shower or bath the night before surgery. Use the soap your surgeon gave you to gently clean your skin. If you do not have soap from your surgeon, use your regular soap. Do not shave or scrub the surgery site.  Wear clean pajamas and have clean sheets on your bed.           Follow-ups after your visit        Who to contact     If you have questions or need follow up information about today's clinic visit or your schedule please contact [facility] directly at [phone].  Normal or non-critical lab and imaging results will be communicated to you by MyChart, letter or phone within 4 business days after the clinic has received the results. If you do not hear from us within 7 days, please contact the clinic through MyChart or phone. If you have a critical or abnormal lab result, we will notify you by phone as soon as possible.  Submit refill requests through [employer] or call your pharmacy and they will forward the refill  "request to us. Please allow 3 business days for your refill to be completed.          Additional Information About Your Visit        MyChart Information     [employer] lets you send messages to your doctor, view your test results, renew your prescriptions, schedule appointments and more. To sign up, go to [email or web address] . Click on \"Log in\" on the left side of the screen, which will take you to the Welcome page. Then click on \"Sign up Now\" on the right side of the page.     You will be asked to enter the access code listed below, as well as some personal information. Please follow the directions to create your username and password.     Your access code is: [email or web address]  Expires: 10/4/2017  2:43 PM     Your access code will  in 90 days. If you need help or a new code, please call your [place] [facility] clinic or [phone].        Care EveryWhere ID     This is your Care EveryWhere ID. This could be used by other organizations to access your [facility] medical records  [id number]        Your Vitals Were     Pulse Temperature BMI (Body Mass Index)             71 98.9  F (37.2  C) (Oral) 29.02 kg/m2          Blood Pressure from Last 3 Encounters:   17 118/73   17 114/74   03/10/17 138/85    Weight from Last 3 Encounters:   17 214 lb (97.1 kg)   17 212 lb 11.2 oz (96.5 kg)   03/10/17 235 lb (106.6 kg)              We Performed the Following     Hemoglobin     Rapid strep screen        Primary Care Provider Office Phone # Fax #    Tyler Cyrus Oropeza PA-C [phone]       [address] [address]        Equal Access to Services     MONA CASTELLANOS : Fidelia Agrawal, guilherme tukcer, emilio kaalmaalan boyce, marisa luz. So [facility] [phone].    ATENCIÓN: Si habla español, tiene a gaona disposición servicios gratuitos de asistencia lingüística. Llame al [phone].    We comply with " applicable federal civil rights laws and [place] laws. We do not discriminate on the basis of race, color, national origin, age, disability sex, sexual orientation or gender identity.            Thank you!     Thank you for choosing [facility]  for your care. Our goal is always to provide you with excellent care. Hearing back from our patients is one way we can continue to improve our services. Please take a few minutes to complete the written survey that you may receive in the mail after your visit with us. Thank you!             Your Updated Medication List - Protect others around you: Learn how to safely use, store and throw away your medicines at www.disposemymeds.org.          This list is accurate as of: 7/21/17 12:10 PM.  Always use your most recent med list.                   Brand Name Dispense Instructions for use Diagnosis    ADDERALL 20 MG per tablet   Generic drug:  amphetamine-dextroamphetamine      Take 1 tablet (20 mg) by mouth 2 times daily        CLONAZEPAM PO      Dose unknown        cyclobenzaprine 10 MG tablet    FLEXERIL    30 tablet    TAKE ONE-HALF TO ONE TABLET BY MOUTH THREE TIMES A DAY AS NEEDED FOR MUSCLE SPASMS    Left-sided low back pain without sciatica, unspecified chronicity       finasteride 1 MG tablet    PROPECIA    30 tablet    Take 1 tablet (1 mg) by mouth daily    Alopecia       GABAPENTIN PO      Dose unknown        nabumetone 750 MG tablet    RELAFEN    60 tablet    Take 1 tablet (750 mg) by mouth 2 times daily    Acute midline low back pain without sciatica       SEROQUEL PO      Dose unknown.        zolpidem 10 MG tablet    AMBIEN    30 tablet    Take 0.5-1 tablets (5-10 mg) by mouth nightly as needed for sleep    PTSD (post-traumatic stress disorder)

## 2017-07-22 LAB
BACTERIA SPEC CULT: NORMAL
MICRO REPORT STATUS: NORMAL
SPECIMEN SOURCE: NORMAL

## 2017-09-02 DIAGNOSIS — M54.50 LEFT-SIDED LOW BACK PAIN WITHOUT SCIATICA, UNSPECIFIED CHRONICITY: ICD-10-CM

## 2017-09-02 NOTE — TELEPHONE ENCOUNTER
Pending Prescriptions:                       Disp   Refills    cyclobenzaprine (FLEXERIL) 10 MG tablet [*30 tab*1            Sig: TAKE ONE-HALF TO ONE TABLET BY MOUTH THREE TIMES           A DAY AS NEEDED FOR MUSCLE SPASMS            Last Written Prescription Date:  7/12/2017  Last Fill Quantity: 30,   # refills: 1  Last Office Visit with [facility], [facility] or [facility] prescribing provider: 7/21/2017Sandip    Future Office visit:       Routing refill request to provider for review/approval because:  Drug not on the [facility], [facility] or  [facility] refill protocol or controlled substance

## 2017-09-06 NOTE — TELEPHONE ENCOUNTER
Pt reports he did not see ortho, did not need to. Takes flexeril after work outs. Has taken it for years.   Please advise.   Favio Brannon RN

## 2017-09-06 NOTE — TELEPHONE ENCOUNTER
Routing refill request to provider for review/approval because:  Drug not on the [facility] refill protocol   Harmony Cope RN, BSN

## 2017-09-06 NOTE — TELEPHONE ENCOUNTER
Can we call patient. Was referred to ortho in July. Has patient followed up with them? Flexeril while it is a relatively safe medication is not meant for long term use.     -Jamie Oropeza, PAC

## 2017-09-07 RX ORDER — CYCLOBENZAPRINE HCL 10 MG
TABLET ORAL
Qty: 30 TABLET | Refills: 1 | Status: SHIPPED | OUTPATIENT
Start: 2017-09-07 | End: 2017-10-09

## 2017-10-09 DIAGNOSIS — M54.50 LEFT-SIDED LOW BACK PAIN WITHOUT SCIATICA, UNSPECIFIED CHRONICITY: ICD-10-CM

## 2017-10-09 NOTE — TELEPHONE ENCOUNTER
Pending Prescriptions:                       Disp   Refills    cyclobenzaprine (FLEXERIL) 10 MG tablet [*30 tab*1            Sig: TAKE ONE-HALF TO ONE TABLET BY MOUTH THREE TIMES           A DAY AS NEEDED FOR MUSCLE SPASMS              Last Written Prescription Date:  9/7/2017  Last Fill Quantity: 30,   # refills: 1  Last Office Visit with [facility], [facility] or [facility] prescribing provider: 7/21/2017Sandip    Future Office visit:       Routing refill request to provider for review/approval because:  Drug not on the [facility], [facility] or  [facility] refill protocol or controlled substance

## 2017-10-12 RX ORDER — CYCLOBENZAPRINE HCL 10 MG
5-10 TABLET ORAL DAILY PRN
Qty: 30 TABLET | Refills: 2 | Status: SHIPPED | OUTPATIENT
Start: 2017-10-12 | End: 2018-08-24

## 2017-10-12 NOTE — TELEPHONE ENCOUNTER
This was just given last month 30 with a refill. He should not need a refill this quickly if taking it as informed last month:     Favio Brannon RN        1:12 PM   Note      Pt reports he did not see ortho, did not need to. Takes flexeril after work outs. Has taken it for years.   Please advise.   Favio Brannon, RN               Can we call and see if this is even needed?    -Jamie arreaga PAC

## 2017-10-12 NOTE — TELEPHONE ENCOUNTER
Please call patient. See patient's response from last refill. He states he takes this after workouts. Patient should not be taking this more than once daily. Though flexeril cannot be abused, it does have some slight addicting proprieties. Refill with new sig sent.     -Jamie Oropeza, PAC

## 2017-10-12 NOTE — TELEPHONE ENCOUNTER
Routing refill request to provider for review/approval because:  Drug not on the [facility] refill protocol     RX monitoring program ([place]MP) reviewed:  reviewed- recommend provider review    [place]MP profile:  [email or web address]-[email or web address] IQ (Social Influence Quotient)/    Mila RUEDA RN, BSN, PHN  Nevaeh Beckett RN

## 2017-12-14 DIAGNOSIS — F43.10 PTSD (POST-TRAUMATIC STRESS DISORDER): ICD-10-CM

## 2017-12-14 DIAGNOSIS — Z79.899 CONTROLLED SUBSTANCE AGREEMENT SIGNED: ICD-10-CM

## 2017-12-14 RX ORDER — ZOLPIDEM TARTRATE 10 MG/1
5-10 TABLET ORAL
Qty: 30 TABLET | Refills: 0 | Status: SHIPPED | OUTPATIENT
Start: 2017-12-14 | End: 2017-12-19

## 2017-12-14 NOTE — LETTER
December 19, 2017      Shiv Lewisgerald  [address] [address] [address]        Dear ,    My nurse has been unable to reach you by telephone for a message regarding a prescription for zolpidem. Please call the clinic at the number listed above and reference a refill message in your chart dated 12/14/17.      Sincerely,    Tyler Oropeza PA-C

## 2017-12-26 ENCOUNTER — TELEPHONE (OUTPATIENT)
Dept: FAMILY MEDICINE | Facility: CLINIC | Age: 47
End: 2017-12-26

## 2017-12-26 DIAGNOSIS — F43.10 PTSD (POST-TRAUMATIC STRESS DISORDER): ICD-10-CM

## 2017-12-26 NOTE — TELEPHONE ENCOUNTER
Pt returning calls from 12/14/17 refill encounter. Requests refill zolpidem.  Shiv was having phone problems so did not receive our messages until today.  Now has working phone.   Aware we shredded zolpidem because no call back.   We asked him reason psych is prescribing clonazepam? Replied PTSD.    He confirmed does not take for sleep. He is aware clonazepam is not recommend to take within 12 hours of ambien due to potential interaction of respiratory depression.   Jamie,  OK to re-prescribe?    We will call Shiv tomorrow when Rx sent  Favio Brannon RN

## 2017-12-26 NOTE — TELEPHONE ENCOUNTER
Will assess tomorrow and via PDMP. If prescribed twice daily, I will not be prescribing ambien why he is taking this.     -Jamie Oropeza, PAC

## 2017-12-27 RX ORDER — ZOLPIDEM TARTRATE 10 MG/1
5-10 TABLET ORAL
Qty: 30 TABLET | Refills: 0 | Status: SHIPPED | OUTPATIENT
Start: 2017-12-27 | End: 2018-08-24

## 2017-12-27 NOTE — TELEPHONE ENCOUNTER
Pt reports takes clonazepam once daily in AM either before I get to work or when I get there. Usual work day starts 7:30 AM - 8:00 AM   Favio Brannon RN

## 2017-12-27 NOTE — TELEPHONE ENCOUNTER
pdmp reviewed. Appears clonazepam is prescribed 30 tabs a day. Please call and inquire when and how this is taken.     Thanks,    Jamie Oropeza, PAC

## 2018-04-18 ENCOUNTER — APPOINTMENT (OUTPATIENT)
Dept: MRI IMAGING | Facility: CLINIC | Age: 48
End: 2018-04-18
Payer: COMMERCIAL

## 2018-04-18 ENCOUNTER — HOSPITAL ENCOUNTER (EMERGENCY)
Facility: CLINIC | Age: 48
Discharge: HOME | End: 2018-04-18
Attending: EMERGENCY MEDICINE | Admitting: EMERGENCY MEDICINE
Payer: COMMERCIAL

## 2018-04-18 VITALS
TEMPERATURE: 98.8 F | SYSTOLIC BLOOD PRESSURE: 123 MMHG | HEART RATE: 97 BPM | RESPIRATION RATE: 16 BRPM | OXYGEN SATURATION: 98 % | DIASTOLIC BLOOD PRESSURE: 72 MMHG

## 2018-04-18 DIAGNOSIS — R42 VERTIGO: ICD-10-CM

## 2018-04-18 LAB
ALBUMIN SERPL-MCNC: 2.6 G/DL (ref 3.4–5)
ALP SERPL-CCNC: 61 U/L (ref 40–150)
ALT SERPL W P-5'-P-CCNC: 253 U/L (ref 0–70)
AMPHETAMINES UR QL SCN: POSITIVE
ANION GAP SERPL CALCULATED.3IONS-SCNC: 8 MMOL/L (ref 3–14)
AST SERPL W P-5'-P-CCNC: 148 U/L (ref 0–45)
BARBITURATES UR QL: NEGATIVE
BASOPHILS # BLD AUTO: 0 10E9/L (ref 0–0.2)
BASOPHILS NFR BLD AUTO: 0.2 %
BENZODIAZ UR QL: NEGATIVE
BILIRUB SERPL-MCNC: 0.5 MG/DL (ref 0.2–1.3)
BUN SERPL-MCNC: 6 MG/DL (ref 7–30)
CALCIUM SERPL-MCNC: 7.6 MG/DL (ref 8.5–10.1)
CANNABINOIDS UR QL SCN: POSITIVE
CHLORIDE SERPL-SCNC: 106 MMOL/L (ref 94–109)
CO2 SERPL-SCNC: 25 MMOL/L (ref 20–32)
COCAINE UR QL: NEGATIVE
CREAT SERPL-MCNC: 0.88 MG/DL (ref 0.66–1.25)
DIFFERENTIAL METHOD BLD: ABNORMAL
EOSINOPHIL # BLD AUTO: 0.1 10E9/L (ref 0–0.7)
EOSINOPHIL NFR BLD AUTO: 1.2 %
ERYTHROCYTE [DISTWIDTH] IN BLOOD BY AUTOMATED COUNT: 14 % (ref 10–15)
ETHANOL UR QL SCN: NEGATIVE
GFR SERPL CREATININE-BSD FRML MDRD: >90 ML/MIN/1.7M2
GLUCOSE SERPL-MCNC: 120 MG/DL (ref 70–99)
HCT VFR BLD AUTO: 42.9 % (ref 40–53)
HGB BLD-MCNC: 14.2 G/DL (ref 13.3–17.7)
IMM GRANULOCYTES # BLD: 0 10E9/L (ref 0–0.4)
IMM GRANULOCYTES NFR BLD: 0.2 %
LYMPHOCYTES # BLD AUTO: 1.6 10E9/L (ref 0.8–5.3)
LYMPHOCYTES NFR BLD AUTO: 13.5 %
MAGNESIUM SERPL-MCNC: 1.9 MG/DL (ref 1.6–2.3)
MCH RBC QN AUTO: 30.1 PG (ref 26.5–33)
MCHC RBC AUTO-ENTMCNC: 33.1 G/DL (ref 31.5–36.5)
MCV RBC AUTO: 91 FL (ref 78–100)
MONOCYTES # BLD AUTO: 1.1 10E9/L (ref 0–1.3)
MONOCYTES NFR BLD AUTO: 9.2 %
NEUTROPHILS # BLD AUTO: 8.7 10E9/L (ref 1.6–8.3)
NEUTROPHILS NFR BLD AUTO: 75.7 %
NRBC # BLD AUTO: 0 10*3/UL
NRBC BLD AUTO-RTO: 0 /100
OPIATES UR QL SCN: NEGATIVE
PHOSPHATE SERPL-MCNC: 1.9 MG/DL (ref 2.5–4.5)
PLATELET # BLD AUTO: 416 10E9/L (ref 150–450)
POTASSIUM SERPL-SCNC: 3.8 MMOL/L (ref 3.4–5.3)
PROT SERPL-MCNC: 7 G/DL (ref 6.8–8.8)
RBC # BLD AUTO: 4.72 10E12/L (ref 4.4–5.9)
SODIUM SERPL-SCNC: 139 MMOL/L (ref 133–144)
WBC # BLD AUTO: 11.5 10E9/L (ref 4–11)

## 2018-04-18 PROCEDURE — 83735 ASSAY OF MAGNESIUM: CPT | Performed by: FAMILY MEDICINE

## 2018-04-18 PROCEDURE — 80307 DRUG TEST PRSMV CHEM ANLYZR: CPT | Performed by: FAMILY MEDICINE

## 2018-04-18 PROCEDURE — A9585 GADOBUTROL INJECTION: HCPCS | Performed by: EMERGENCY MEDICINE

## 2018-04-18 PROCEDURE — 99285 EMERGENCY DEPT VISIT HI MDM: CPT | Mod: 25 | Performed by: EMERGENCY MEDICINE

## 2018-04-18 PROCEDURE — 80320 DRUG SCREEN QUANTALCOHOLS: CPT | Performed by: FAMILY MEDICINE

## 2018-04-18 PROCEDURE — 99284 EMERGENCY DEPT VISIT MOD MDM: CPT | Mod: Z6 | Performed by: EMERGENCY MEDICINE

## 2018-04-18 PROCEDURE — 25000128 H RX IP 250 OP 636: Performed by: EMERGENCY MEDICINE

## 2018-04-18 PROCEDURE — 70553 MRI BRAIN STEM W/O & W/DYE: CPT

## 2018-04-18 PROCEDURE — 80053 COMPREHEN METABOLIC PANEL: CPT | Performed by: FAMILY MEDICINE

## 2018-04-18 PROCEDURE — 85025 COMPLETE CBC W/AUTO DIFF WBC: CPT | Performed by: FAMILY MEDICINE

## 2018-04-18 PROCEDURE — 25000131 ZZH RX MED GY IP 250 OP 636 PS 637: Performed by: FAMILY MEDICINE

## 2018-04-18 PROCEDURE — 84100 ASSAY OF PHOSPHORUS: CPT | Performed by: FAMILY MEDICINE

## 2018-04-18 RX ORDER — MECLIZINE HYDROCHLORIDE 25 MG/1
25 TABLET ORAL EVERY 6 HOURS PRN
Qty: 30 TABLET | Refills: 1 | Status: SHIPPED | OUTPATIENT
Start: 2018-04-18 | End: 2018-08-24

## 2018-04-18 RX ORDER — CLONAZEPAM 2 MG/1
2 TABLET ORAL AT BEDTIME
COMMUNITY
End: 2018-08-24

## 2018-04-18 RX ORDER — DEXTROAMPHETAMINE SACCHARATE, AMPHETAMINE ASPARTATE MONOHYDRATE, DEXTROAMPHETAMINE SULFATE AND AMPHETAMINE SULFATE 5; 5; 5; 5 MG/1; MG/1; MG/1; MG/1
20 CAPSULE, EXTENDED RELEASE ORAL DAILY
COMMUNITY
End: 2018-08-24

## 2018-04-18 RX ORDER — MECLIZINE HYDROCHLORIDE 25 MG/1
25 TABLET ORAL ONCE
Status: COMPLETED | OUTPATIENT
Start: 2018-04-18 | End: 2018-04-18

## 2018-04-18 RX ORDER — GADOBUTROL 604.72 MG/ML
10 INJECTION INTRAVENOUS ONCE
Status: COMPLETED | OUTPATIENT
Start: 2018-04-18 | End: 2018-04-18

## 2018-04-18 RX ORDER — GABAPENTIN 300 MG/1
300 CAPSULE ORAL 3 TIMES DAILY
COMMUNITY
End: 2018-08-24

## 2018-04-18 RX ADMIN — MECLIZINE HYDROCHLORIDE 25 MG: 25 TABLET ORAL at 14:57

## 2018-04-18 RX ADMIN — MECLIZINE HYDROCHLORIDE 25 MG: 25 TABLET ORAL at 17:46

## 2018-04-18 RX ADMIN — GADOBUTROL 10 ML: 604.72 INJECTION INTRAVENOUS at 15:18

## 2018-04-18 NOTE — ED AVS SNAPSHOT
[facility], [place], Emergency Department    [address]    [facility] [address]    Phone:  [phone]    Fax:  [phone]                                       Shiv Amador   MRN: [id number]    Department:  [facility], Emergency Department   Date of Visit:  4/18/2018           After Visit Summary Signature Page     I have received my discharge instructions, and my questions have been answered. I have discussed any challenges I see with this plan with the nurse or doctor.    ..........................................................................................................................................  Patient/Patient Representative Signature      ..........................................................................................................................................  Patient Representative Print Name and Relationship to Patient    ..................................................               ................................................  Date                                            Time    ..........................................................................................................................................  Reviewed by Signature/Title    ...................................................              ..............................................  Date                                                            Time

## 2018-04-18 NOTE — DISCHARGE INSTRUCTIONS
TODAY'S VISIT:  You were seen today for dizziness, suspect vertigo.  Liver enzymes were elevated on arrival.  Follow-up with your primary care provider regarding this.  -     FOLLOW-UP:  Please make an appointment to follow up with:  - Your Primary Care Provider as soon as possible.    PRESCRIPTIONS / MEDICATIONS:  -Meclizine.  This is to treat vertigo.  Okay to discontinue use if this does not help your symptoms.    OTHER INSTRUCTIONS:  -     RETURN TO THE EMERGENCY DEPARTMENT  Return to the Emergency Department at any time for new/worsening symptoms.

## 2018-04-18 NOTE — ED TRIAGE NOTES
Patient stopped taking Adderal and Klonipin two weeks ago because he ran out of medications and needs to establish care with a new psychiatrist. Experiencing severe dizziness, nausea, and numbness/tingling to bilateral hands. Feels restless and out of sorts.

## 2018-04-18 NOTE — ED AVS SNAPSHOT
[facility], Emergency Department    [address]    [place] [address]    Phone:  [phone]    Fax:  [phone]                                       Shiv Amador   MRN: [id number]    Department:  [facility], Emergency Department   Date of Visit:  4/18/2018           Patient Information     Date Of Birth          1970        Your diagnoses for this visit were:     Vertigo        You were seen by Leonardo Tay MD.        Discharge Instructions       TODAY'S VISIT:  You were seen today for dizziness, suspect vertigo.  Liver enzymes were elevated on arrival.  Follow-up with your primary care provider regarding this.  -     FOLLOW-UP:  Please make an appointment to follow up with:  - Your Primary Care Provider as soon as possible.    PRESCRIPTIONS / MEDICATIONS:  -Meclizine.  This is to treat vertigo.  Okay to discontinue use if this does not help your symptoms.    OTHER INSTRUCTIONS:  -     RETURN TO THE EMERGENCY DEPARTMENT  Return to the Emergency Department at any time for new/worsening symptoms.       24 Hour Appointment Hotline       To make an appointment at any [facility] clinic, call [phone]). If you don't have a family doctor or clinic, we will help you find one. [facility] clinics are conveniently located to serve the needs of you and your family.             Review of your medicines      START taking        Dose / Directions Last dose taken    meclizine 25 MG tablet   Commonly known as:  ANTIVERT   Dose:  25 mg   Quantity:  30 tablet        Take 1 tablet (25 mg) by mouth every 6 hours as needed for dizziness   Refills:  1          Our records show that you are taking the medicines listed below. If these are incorrect, please call your family doctor or clinic.        Dose / Directions Last dose taken    * ADDERALL XR 20 MG per 24 hr capsule   Dose:  20 mg   Generic drug:  amphetamine-dextroamphetamine        Take 20 mg by mouth daily   Refills:  0         * ADDERALL 20 MG per tablet   Dose:  20 mg   Generic drug:  amphetamine-dextroamphetamine        Take 20 mg by mouth daily   Refills:  0        clonazePAM 2 MG tablet   Commonly known as:  klonoPIN   Dose:  2 mg        Take 2 mg by mouth At Bedtime   Refills:  0        cyclobenzaprine 10 MG tablet   Commonly known as:  FLEXERIL   Dose:  5-10 mg   Quantity:  30 tablet        Take 0.5-1 tablets (5-10 mg) by mouth daily as needed for muscle spasms   Refills:  2        finasteride 1 MG tablet   Commonly known as:  PROPECIA   Dose:  1 tablet   Quantity:  30 tablet        Take 1 tablet (1 mg) by mouth daily   Refills:  5        gabapentin 300 MG capsule   Commonly known as:  NEURONTIN   Dose:  300 mg        Take 300 mg by mouth 3 times daily   Refills:  0        nabumetone 750 MG tablet   Commonly known as:  RELAFEN   Dose:  750 mg   Quantity:  60 tablet        Take 1 tablet (750 mg) by mouth 2 times daily   Refills:  1        SEROQUEL PO        Dose unknown.   Refills:  0        zolpidem 10 MG tablet   Commonly known as:  AMBIEN   Dose:  5-10 mg   Quantity:  30 tablet        Take 0.5-1 tablets (5-10 mg) by mouth nightly as needed for sleep   Refills:  0        * Notice:  This list has 2 medication(s) that are the same as other medications prescribed for you. Read the directions carefully, and ask your doctor or other care provider to review them with you.            Prescriptions were sent or printed at these locations (1 Prescription)                   Other Prescriptions                Printed at Department/Unit printer (1 of 1)         meclizine (ANTIVERT) 25 MG tablet                Procedures and tests performed during your visit     CBC with platelets differential    Comprehensive metabolic panel    Drug abuse screen 6 urine (tox)    MR Brain w/o & w Contrast    Magnesium    Phosphorus      Orders Needing Specimen Collection     None      Pending Results     No orders found from 4/16/2018 to 4/19/2018.           "  Pending Culture Results     No orders found from 2018 to 2018.            Pending Results Instructions     If you had any lab results that were not finalized at the time of your Discharge, you can call the ED Lab Result RN at [phone]. You will be contacted by this team for any positive Lab results or changes in treatment. The nurses are available 7 days a week from 10A to 6:30P.  You can leave a message 24 hours per day and they will return your call.        Thank you for choosing [facility]       Thank you for choosing [facility] for your care. Our goal is always to provide you with excellent care. Hearing back from our patients is one way we can continue to improve our services. Please take a few minutes to complete the written survey that you may receive in the mail after you visit with us. Thank you!        [employer] Information     [employer] lets you send messages to your doctor, view your test results, renew your prescriptions, schedule appointments and more. To sign up, go to [email or web address] . Click on \"Log in\" on the left side of the screen, which will take you to the Welcome page. Then click on \"Sign up Now\" on the right side of the page.     You will be asked to enter the access code listed below, as well as some personal information. Please follow the directions to create your username and password.     Your access code is: [email or web address]  Expires: 2018  5:41 PM     Your access code will  in 90 days. If you need help or a new code, please call your [facility] clinic or [phone].        Care EveryWhere ID     This is your Care EveryWhere ID. This could be used by other organizations to access your [facility] medical records  [id number]        Equal Access to Services     MONA CASTELLANOS : guilherme Tang, marisa laurent. So [facility] [phone].    ATENCIÓN: Si habla español, tiene a gaona disposición " servicios gratuitos de asistencia lingüística. Maeve valdovinos [phone].    We comply with applicable federal civil rights laws and [place] laws. We do not discriminate on the basis of race, color, national origin, age, disability, sex, sexual orientation, or gender identity.            After Visit Summary       This is your record. Keep this with you and show to your community pharmacist(s) and doctor(s) at your next visit.

## 2018-04-18 NOTE — ED PROVIDER NOTES
History     Chief Complaint   Patient presents with     Withdrawal     Stopped taking his psych meds 1-2 weeks ago and is having withdrawal, nausea, dizziness for the past 4 days     HPI  Shiv Amador is a 47 year old male with a past medical history of chronic back pain and ADHD previously on Adderall, clonazepam, Flexeril, and gabapentin now presenting with dizzy spells that have worsened over the last 4 days.  He states he ran out of Adderall and clonazepam about 2-3 weeks ago due to his psychiatrists no longer prescribe medications and he was advised to seek medical attention from another  psychiatrist for establishment of new care. Due to his work as a  he has very limited time and was unable to follow-up with a psychiatrist.  He states he has had some lightheadedness without any evidence of the room spinning and is related to any type of movement that he has.  He does not describe any positional vertigo but does also complain of some mild hand tingling bilaterally with overall body weakness.    In the emergency room, the patient was seen and examined and noted to be slightly anxious with mild tachycardia to 113.  Vitals remained otherwise stable.  When discussing patient's history of alcohol abuse he states he has not had any alcoholic beverage for the last several years and only smokes marijuana 2 times a month currently.  He does smoke tobacco socially as well.       I have reviewed the Medications, Allergies, Past Medical and Surgical History, and Social History in the Epic system.    Review of Systems  + : Mild exertional SOB, nausea, chills   - : Chest pain, vomiting, fever, abdominal pain, numbness/tingling.   Physical Exam   BP: 131/80  Pulse: 113  Heart Rate: 97  Temp: 98.8  F (37.1  C)  Resp: 20  SpO2: 98 %      Physical Exam  Constitutional: Oriented to person, place, and time. Appears well-developed and well-nourished with mildly anxious.   HENT:   Head:  Normocephalic and atraumatic. nontender to palpation over scalp.  Eyes: Conjunctivae are normal. PERRLA.   Neck: Normal range of motion. No stiffness, or TTP.   Cardiovascular: Normal rate, regular rhythm, normal heart sounds and intact distal pulses.    Pulmonary/Chest: Effort normal and breath sounds normal. No respiratory distress. No wheezes.   Abdominal: Soft. Exhibits no distension. There is no tenderness.   Musculoskeletal: Normal range of motion. No lower extremity edema.  Neurological: Alert and oriented to person, place, and time.   Skin: Skin is warm and dry.   Psychiatric: Has a normal mood and affect. Behavior is mildly anxious.     ED Course     ED Course     Procedures        EKG: refused    Critical Care time:  none     Labs Ordered and Resulted from Time of ED Arrival Up to the Time of Departure from the ED   DRUG ABUSE SCREEN 6 CHEM DEP URINE ([facility]) - Abnormal; Notable for the following:        Result Value    Amphetamine Qual Urine Positive (*)     Cannabinoids Qual Urine Positive (*)     All other components within normal limits   CBC WITH PLATELETS DIFFERENTIAL - Abnormal; Notable for the following:     WBC 11.5 (*)     Absolute Neutrophil 8.7 (*)     All other components within normal limits   COMPREHENSIVE METABOLIC PANEL - Abnormal; Notable for the following:     Glucose 120 (*)     Urea Nitrogen 6 (*)     Calcium 7.6 (*)     Albumin 2.6 (*)      (*)      (*)     All other components within normal limits   PHOSPHORUS - Abnormal; Notable for the following:     Phosphorus 1.9 (*)     All other components within normal limits   MAGNESIUM            Assessments & Plan (with Medical Decision Making)   47 year old male with pmhx of anxiety, ADHD, and chronic back pain now presenting today with tachycardia, dizziness acutely worsening over the last 4 days in the setting of running out of adderall and klonipin 2-3 weeks ago. He has not had any seizure like activity and is only  "complaining of light headedness and dizziness.     DDx: BZD withdrawal vs. aderall withdrawal (less likely as he is 2-3 wks out from last dose) vs. New onset Vertigo (central or peripheral).    In the ER, he was given 25mg of meclizine x 2 and underwent an MRI of brain to rule out central cause of vertigo. Given the lack of cardiac history and benign physical exam cardiac etiology is less suspicious. He could potentially be withdrawing from klonipin. He also is taking flexeril and may potentially mask signs of active withdrawal from BZD. His  does not show any recent prescriptions of klonipin within the last 4 months but he has been taking it somehow until 2-3 weeks ago.     F/u signed out:  -F/u EKG after MRI completed  -F/u MRI brain to r/o central etiology of vertigo  -F/u response to meclizine and possibly d/c home with Rx if all other w/up is negative.     I have reviewed the nursing notes.    I have reviewed the findings, diagnosis, plan and need for follow up with the patient.    Discharge Medication List as of 4/18/2018  5:41 PM      START taking these medications    Details   meclizine (ANTIVERT) 25 MG tablet Take 1 tablet (25 mg) by mouth every 6 hours as needed for dizziness, Disp-30 tablet, R-1, Local Print             Final diagnoses:   Vertigo       4/18/2018   [facility], [place], EMERGENCY DEPARTMENT    Faculty addendum:  I saw and evaluated this patient independently of the resident.  Obtained history patient states that his \"dizziness\" has been steadily worsening for the last 4 days.  Endorses it is worse with walking or movement of his head.  Denies associated chest pain, palpitations, shortness of breath, syncope or near syncopal symptoms.  He states it feels as though he might \"fall over\" prompting evaluation for possible vertigo.  He also endorsed mild headache with this.  Obtained MRI which was negative ruling out concerning central causes of vertigo.  Patient had significant relief with the " "meclizine.  Tried to obtain EKG given concern this could be presyncopal patient declined stating he had no chest pain, shortness of breath, and was \"sick of being evaluated\" he understands we could be missing cardiac pathology.,  Potentially serious life-threatening and still declined.  It is reassuring the patient's symptoms had significantly improved with meclizine.    Patient's tox screen positive for amphetamines initially patient stated he had been off his Adderall for 2-3 weeks which would not explain a positive amphetamine tox screen, he later stated he found 1 pill in the bottom the bag several days ago which may be the explanation of the positive tox screen.  Additionally, we contacted the pharmacy which reported last prescription was picked up in December.  Patient denied taking illicit benzos, additionally patient denied taking any illegal street drugs.  Discussed the case with the physician covering  addiction medicine, he did not feel that this could be benzo withdrawals 2-3 weeks after last dose ( as patient is reporting) he did not feel that benzo withdrawal was a concern.  Discussed this with patient, he declined taking any illicit  Benzodiazepines so assume he is out of the window for benzodiazepine withdrawal.      Labs including low phosphorus and elevated LFTs.  He reports he has primary care follow-up tomorrow and will discuss these with his primary care physician will need further evaluation for this.  Patient ultimately symptom-free after 50 mg of meclizine, his vital signs normalized.  Will treat for vertigo and have him follow-up with his primary care provider tomorrow.     Leonardo Tay MD  04/18/18 2110    "

## 2018-05-04 ENCOUNTER — TELEPHONE (OUTPATIENT)
Dept: FAMILY MEDICINE | Facility: CLINIC | Age: 48
End: 2018-05-04

## 2018-05-04 NOTE — TELEPHONE ENCOUNTER
Patient calling and states his current psychiatrist no longer available and wondering if [facility] would fill Adderall and Clonazepam short term til gets in with his new psychiatrist.  Also wants Flexaril and Propecia refilled.  Advised would need to see [facility] to discuss Adderall and Clonazepam.  Scheduled for Monday at 8 am.  Will discuss all refills at visit.  Abida Awan RN

## 2018-08-24 ENCOUNTER — NURSE TRIAGE (OUTPATIENT)
Dept: NURSING | Facility: CLINIC | Age: 48
End: 2018-08-24

## 2018-08-24 ENCOUNTER — OFFICE VISIT (OUTPATIENT)
Dept: FAMILY MEDICINE | Facility: CLINIC | Age: 48
End: 2018-08-24
Payer: COMMERCIAL

## 2018-08-24 ENCOUNTER — TELEPHONE (OUTPATIENT)
Dept: FAMILY MEDICINE | Facility: CLINIC | Age: 48
End: 2018-08-24

## 2018-08-24 VITALS
RESPIRATION RATE: 22 BRPM | SYSTOLIC BLOOD PRESSURE: 137 MMHG | BODY MASS INDEX: 28.31 KG/M2 | DIASTOLIC BLOOD PRESSURE: 84 MMHG | HEART RATE: 86 BPM | WEIGHT: 209 LBS | HEIGHT: 72 IN | TEMPERATURE: 97.9 F

## 2018-08-24 DIAGNOSIS — F43.23 ADJUSTMENT DISORDER WITH MIXED ANXIETY AND DEPRESSED MOOD: ICD-10-CM

## 2018-08-24 DIAGNOSIS — Z79.899 CONTROLLED SUBSTANCE AGREEMENT SIGNED: ICD-10-CM

## 2018-08-24 DIAGNOSIS — F90.2 ATTENTION DEFICIT HYPERACTIVITY DISORDER (ADHD), COMBINED TYPE: Primary | ICD-10-CM

## 2018-08-24 DIAGNOSIS — M54.50 ACUTE MIDLINE LOW BACK PAIN WITHOUT SCIATICA: ICD-10-CM

## 2018-08-24 DIAGNOSIS — M54.50 LEFT-SIDED LOW BACK PAIN WITHOUT SCIATICA, UNSPECIFIED CHRONICITY: ICD-10-CM

## 2018-08-24 DIAGNOSIS — L65.9 ALOPECIA: ICD-10-CM

## 2018-08-24 PROCEDURE — 80307 DRUG TEST PRSMV CHEM ANLYZR: CPT | Mod: 90 | Performed by: PHYSICIAN ASSISTANT

## 2018-08-24 PROCEDURE — 99214 OFFICE O/P EST MOD 30 MIN: CPT | Performed by: PHYSICIAN ASSISTANT

## 2018-08-24 PROCEDURE — 99000 SPECIMEN HANDLING OFFICE-LAB: CPT | Performed by: PHYSICIAN ASSISTANT

## 2018-08-24 RX ORDER — DULOXETIN HYDROCHLORIDE 30 MG/1
CAPSULE, DELAYED RELEASE ORAL
Qty: 60 CAPSULE | Refills: 1 | Status: SHIPPED | OUTPATIENT
Start: 2018-08-24

## 2018-08-24 RX ORDER — DEXTROAMPHETAMINE SACCHARATE, AMPHETAMINE ASPARTATE, DEXTROAMPHETAMINE SULFATE AND AMPHETAMINE SULFATE 5; 5; 5; 5 MG/1; MG/1; MG/1; MG/1
20 TABLET ORAL 2 TIMES DAILY
Qty: 60 TABLET | Refills: 0 | Status: SHIPPED | OUTPATIENT
Start: 2018-09-23 | End: 2018-10-23

## 2018-08-24 RX ORDER — DEXTROAMPHETAMINE SACCHARATE, AMPHETAMINE ASPARTATE, DEXTROAMPHETAMINE SULFATE AND AMPHETAMINE SULFATE 5; 5; 5; 5 MG/1; MG/1; MG/1; MG/1
20 TABLET ORAL 2 TIMES DAILY
Qty: 60 TABLET | Refills: 0 | Status: SHIPPED | OUTPATIENT
Start: 2018-10-23 | End: 2018-10-26

## 2018-08-24 RX ORDER — FINASTERIDE 1 MG/1
1 TABLET, FILM COATED ORAL DAILY
Qty: 30 TABLET | Refills: 5 | Status: SHIPPED | OUTPATIENT
Start: 2018-08-24 | End: 2018-10-25

## 2018-08-24 RX ORDER — DEXTROAMPHETAMINE SACCHARATE, AMPHETAMINE ASPARTATE, DEXTROAMPHETAMINE SULFATE AND AMPHETAMINE SULFATE 5; 5; 5; 5 MG/1; MG/1; MG/1; MG/1
20 TABLET ORAL 2 TIMES DAILY
Qty: 60 TABLET | Refills: 0 | Status: SHIPPED | OUTPATIENT
Start: 2018-08-24 | End: 2018-09-23

## 2018-08-24 RX ORDER — CYCLOBENZAPRINE HCL 10 MG
5-10 TABLET ORAL DAILY PRN
Qty: 30 TABLET | Refills: 2 | Status: SHIPPED | OUTPATIENT
Start: 2018-08-24

## 2018-08-24 RX ORDER — LORAZEPAM 0.5 MG/1
0.5 TABLET ORAL EVERY 8 HOURS PRN
Qty: 30 TABLET | Refills: 0 | Status: SHIPPED | OUTPATIENT
Start: 2018-08-24 | End: 2018-10-26

## 2018-08-24 NOTE — TELEPHONE ENCOUNTER
Reason for Call:  Other prescription    Detailed comments: patient just saw Tyler Tinocoin today and was prescribed Ativan. Patient states that he believes it should be a 1mg not 0.5 mg. Please see if can be changed.     Call patient back when switched.,    Phone Number Patient can be reached at: Home number on file [phone] (home)    Best Time: Any     Can we leave a detailed message on this number?   Yes     Call taken on 8/24/2018 at 1:10 PM by Maia Faith

## 2018-08-24 NOTE — LETTER
[facility]    08/24/18    Patient: Shiv Amador  YOB: 1970  Medical Record Number: [id number]                                                                  Controlled Substance Agreement  I understand that my care provider has prescribed controlled substances (narcotics, tranquilizers, and/or stimulants) to help manage my condition(s).  I am taking this medicine to help me function or work.  I know that this is strong medicine.  It could have serious side effects and even cause a dependency on the drug.  If I stop these medicines suddenly, I could have severe withdrawal symptoms.    The risks, benefits, and side effects of these medication(s) were explained to me.  I agree that:  1. I will take part in other treatments as advised by my provider.  This may be psychiatry or counseling, physical therapy, behavioral therapy, group treatment, or a referral to a pain clinic.  I will reduce or stop my medicine when my provider tells me to do so.   2. I will take my medicines as prescribed.  I will not change the dose or schedule unless my provider tells me to.  There will be no refills if I  run out early.   I may be contacted at any time without warning and asked to complete a drug test or pill count.   3. I will keep all my appointments at the clinic.  If I miss appointments or fail to follow instructions, my provider may stop my medicine.  4. I will not ask other providers to prescribe controlled substances. And I will not accept controlled substances from other people. If I need another prescribed controlled substance for a new reason, I will notify my provider within one business day.  5. If I enroll in the [place] Medical Marijuana program, I will tell my provider.  I will also sign an agreement to share my medical records with my provider.  6. I will use one pharmacy to fill all of my controlled substance prescriptions.  If my prescription is mailed to my pharmacy, it may  take 5 to 7 days for my medicine to be ready.  7. I understand that my provider, clinic care team, and pharmacy can track controlled substance prescriptions from other providers through a central database (prescription monitoring program).  8. I will bring in my list of medications (or my medicine bottles) each time I come to the clinic.  [id number] REV-  07/2018                                                                                                                                   Page 1 of 2      [facility]    08/24/18    Patient: Shiv Amador  YOB: 1970  Medical Record Number: [id number]    9. Refills of controlled substances will be made only during office hours.  It is up to me to make sure that I do not run out of my medicines on weekends or holidays.    10. I am responsible for my prescriptions.  If the medicine/prescription is lost or stolen, it will not be replaced.   I also agree not to share these medicines with anyone.  11. I agree to not use ANY illegal or recreational drugs.  This includes marijuana, cocaine, bath salts or other drugs.  I agree not to use alcohol unless my provider says I may.  I agree to give urine samples whenever asked.  If I fail to give a urine sample, the provider may stop my medicine.     12. I will tell my nurse or provider right away if I become pregnant or have a new medical problem treated outside of [facility].  13. I understand that this medicine can affect my thinking and judgment.  It may be unsafe for me to drive, use machinery and do dangerous tasks.  I will not do any of these things until I know how the medicine affects me.  If my dose changes, I will wait to see how it affects me.  I will contact my provider if I have concerns about medicine side effects.  I understand that if I do not follow any of the conditions above, my prescriptions or treatment may be stopped.    I agree that my provider, clinic care team, and  pharmacy may work with any city, state or federal law enforcement agency that investigates the misuse, sale, or other diversion of my controlled medicine. I will allow my provider to discuss my care with or share a copy of this agreement with any other treating provider, pharmacy or emergency room where I receive care.  I agree to give up (waive) any right of privacy or confidentiality with respect to these authorizations.   I have read this agreement and have asked questions about anything I did not understand.   ___________________________________    ___________________________  Patient Signature                                                           Date and Time  ___________________________________     ____________________________  Witness                                                                            Date and Time  ___________________________________  Tyler Oropeza PA-C  [id number] REV-  07/2018                                                                                                                                                   Page 2 of 2

## 2018-08-24 NOTE — TELEPHONE ENCOUNTER
RN reviewed visit note comments about this.   Pt informed Lorazepam 0.5 mg intended.   Pt requests Jamie reconsider 1 mg on Monday.  Favio Brannon RN

## 2018-08-24 NOTE — MR AVS SNAPSHOT
After Visit Summary   8/24/2018    Shiv Amador    MRN: [id number]           Patient Information     Date Of Birth          1970        Visit Information        Provider Department      8/24/2018 9:45 AM Tyler Oropeza PA-C [facility]        Today's Diagnoses     Controlled substance agreement signed    -  1    Adjustment disorder with mixed anxiety and depressed mood        Attention deficit hyperactivity disorder (ADHD), combined type        Alopecia        Acute midline low back pain without sciatica        Left-sided low back pain without sciatica, unspecified chronicity          Care Instructions      Treating Anxiety Disorders with Medicine  An anxiety disorder can make you feel nervous or apprehensive, even without a clear reason. In people age 65 and older, generalized anxiety disorder is one of the most commonly diagnosed anxiety disorders. Many times it occurs with depression. Certain anxiety disorders can cause intense feelings of fear or panic. You may even have physical symptoms such as a racing heartbeat, sweating, or dizziness. If you have these feelings, you don t have to suffer anymore. Treatment to help you overcome your fears will likely include therapy (also called counseling). Medicine may also be prescribed to help control your symptoms.    Medicines  Certain medicines may be prescribed to help control your symptoms. So you may feel less anxious. You may also feel able to move forward with therapy. At first, medicines and dosages may need to be adjusted to find what works best for you. Try to be patient. Tell your healthcare provider how a medicine makes you feel. This way, you can work together to find the treatment that s best for you. Keep in mind that medicines can have side effects. Talk with your provider about any side effects that are bothering you. Changing the dose or type of medicine may help. Don t stop taking medicine on your own.  That can cause symptoms to come back.    Anti-anxiety medicine. This medicine eases symptoms and helps you relax. Your healthcare provider will explain when and how to use it. It may be prescribed for use before situations that make you anxious. You may also be told to take medicine on a regular schedule. Anti-anxiety medicine may make you feel a little sleepy or  out of it.  Don t drive a car or operate machinery while on this medicine, until you know how it affects you.  Caution  Never use alcohol or other drugs with anti-anxiety medicines. This could result in loss of muscular control, sedation, coma, or death. Also, use only the amount of medicine prescribed for you. If you think you may have taken too much, get emergency care right away.     Antidepressant medicine. This kind of medicine is often used to treat anxiety, even if you aren t depressed. An antidepressant helps balance out brain chemicals. This helps keep anxiety under control. This medicine is taken on a schedule. It takes a few weeks to start working. If you don t notice a change at first, you may just need more time. But if you don t notice results after the first few weeks, tell your provider.  Keep taking medicines as prescribed  Never change your dosage, share or use another person's medicine, or stop taking your medicines without talking to your healthcare provider first. Keep the following in mind:    Some medicines must be taken on a schedule. Make this part of your daily routine. For instance, always take your pill before brushing your teeth. A pillbox can help you remember if you ve taken your medicine each day.    Medicines are often taken for 6 to 12 months. Your healthcare provider will then evaluate whether you need to stay on them. Many people who have also had therapy may no longer need medicine to manage anxiety.    You may need to stop taking medicine slowly to give your body time to adjust. When it s time to stop, your healthcare  provider will tell you more. Remember: Never stop taking your medicine without talking to your provider first.    If symptoms return, you may need to start taking medicines again. This isn t your fault. It s just the nature of your anxiety disorder.  Special concerns    Side effects. Medicines may cause side effects. Ask your healthcare provider or pharmacist what you can expect. They may have ideas for avoiding some side effects.    Sexual problems. Some antidepressants can affect your desire for sex or your ability to have an orgasm. A change in dosage or medicine often solves the problem. If you have a sexual side effect that concerns you, tell your healthcare provider.    Addiction. If you ve never had a problem with drugs or alcohol, you may not have a problem with medicines used to treat anxiety disorders. But always discuss the medicines with your healthcare provider before taking them. If you have a history of addiction, you may not be able to use certain medicines used to treat anxiety disorders.    Medicine interactions. Always check with your pharmacist before using any over-the-counter medicines, including herbal supplements.   Date Last Reviewed: 5/1/2017 2000-2017 [employer]. [address] [place] [address]. All rights reserved. This information is not intended as a substitute for professional medical care. Always follow your healthcare professional's instructions.                Follow-ups after your visit        Additional Services     PHYSIATRY REFERRAL       Your provider has referred you to: N: [facility] [phone]   [email or web address]    Please note these locations do not prescribe narcotics or manage chronic pain.    Please be aware that coverage of these services is subject to the terms and limitations of your health insurance plan.  Call member services at your health plan with any benefit or  "coverage questions.      Please bring the following to your appointment:  >>   Any x-rays, CTs or MRIs which have been performed.  Contact the facility where they were done to arrange for  prior to your scheduled appointment.    >>   List of current medications   >>   This referral request   >>   Any documents/labs given to you for this referral                  Who to contact     If you have questions or need follow up information about today's clinic visit or your schedule please contact [facility] directly at [phone].  Normal or non-critical lab and imaging results will be communicated to you by Correlechart, letter or phone within 4 business days after the clinic has received the results. If you do not hear from us within 7 days, please contact the clinic through Correlechart or phone. If you have a critical or abnormal lab result, we will notify you by phone as soon as possible.  Submit refill requests through [employer] or call your pharmacy and they will forward the refill request to us. Please allow 3 business days for your refill to be completed.          Additional Information About Your Visit        [employer] Information     [employer] lets you send messages to your doctor, view your test results, renew your prescriptions, schedule appointments and more. To sign up, go to [email or web address] . Click on \"Log in\" on the left side of the screen, which will take you to the Welcome page. Then click on \"Sign up Now\" on the right side of the page.     You will be asked to enter the access code listed below, as well as some personal information. Please follow the directions to create your username and password.     Your access code is: [email or web address]  Expires: 11/15/2018  8:46 AM     Your access code will  in 90 days. If you need help or a new code, please call your [facility] or [phone].        Care EveryWhere ID     This is your Care EveryWhere ID. This could be used by other " organizations to access your [facility] medical records  [id number]        Your Vitals Were     Pulse Temperature Respirations Height BMI (Body Mass Index)       86 97.9  F (36.6  C) (Oral) 22 6' (1.829 m) 28.35 kg/m2        Blood Pressure from Last 3 Encounters:   08/24/18 137/84   04/18/18 123/72   07/21/17 118/73    Weight from Last 3 Encounters:   08/24/18 209 lb (94.8 kg)   07/21/17 214 lb (97.1 kg)   07/06/17 212 lb 11.2 oz (96.5 kg)              We Performed the Following     Drug  Screen Comprehensive, Urine w/o Reported Meds (Pain Care Package)     PHYSIATRY REFERRAL          Today's Medication Changes          These changes are accurate as of 8/24/18 10:25 AM.  If you have any questions, ask your nurse or doctor.               Start taking these medicines.        Dose/Directions    DULoxetine 30 MG EC capsule   Commonly known as:  CYMBALTA   Used for:  Adjustment disorder with mixed anxiety and depressed mood   Started by:  Tyler Oropeza PA-C        Take 1 capsule (30 mg) daily for 1-2 weeks. Then take 2 capsules (60 mg) daily.   Quantity:  60 capsule   Refills:  1       LORazepam 0.5 MG tablet   Commonly known as:  ATIVAN   Used for:  Adjustment disorder with mixed anxiety and depressed mood   Started by:  Tyler Oropeza PA-C        Dose:  0.5 mg   Take 1 tablet (0.5 mg) by mouth every 8 hours as needed for anxiety   Quantity:  30 tablet   Refills:  0         These medicines have changed or have updated prescriptions.        Dose/Directions    * ADDERALL XR 20 MG per 24 hr capsule   This may have changed:  Another medication with the same name was added. Make sure you understand how and when to take each.   Generic drug:  amphetamine-dextroamphetamine   Changed by:  Tyler Oropeza PA-C        Dose:  20 mg   Take 20 mg by mouth daily   Refills:  0       * ADDERALL 20 MG per tablet   This may have changed:  Another medication with the same name was added. Make sure you understand  how and when to take each.   Generic drug:  amphetamine-dextroamphetamine   Changed by:  Tyler Oropzea PA-C        Dose:  20 mg   Take 20 mg by mouth daily   Refills:  0       * amphetamine-dextroamphetamine 20 MG per tablet   Commonly known as:  ADDERALL   This may have changed:  You were already taking a medication with the same name, and this prescription was added. Make sure you understand how and when to take each.   Used for:  Attention deficit hyperactivity disorder (ADHD), combined type   Changed by:  Tyler Oropeza PA-C        Dose:  20 mg   Take 1 tablet (20 mg) by mouth 2 times daily   Quantity:  60 tablet   Refills:  0       * amphetamine-dextroamphetamine 20 MG per tablet   Commonly known as:  ADDERALL   This may have changed:  You were already taking a medication with the same name, and this prescription was added. Make sure you understand how and when to take each.   Used for:  Attention deficit hyperactivity disorder (ADHD), combined type   Changed by:  Tyler Oropeza PA-C        Dose:  20 mg   Start taking on:  9/23/2018   Take 1 tablet (20 mg) by mouth 2 times daily   Quantity:  60 tablet   Refills:  0       * amphetamine-dextroamphetamine 20 MG per tablet   Commonly known as:  ADDERALL   This may have changed:  You were already taking a medication with the same name, and this prescription was added. Make sure you understand how and when to take each.   Used for:  Attention deficit hyperactivity disorder (ADHD), combined type   Changed by:  Tyler Oropeza PA-C        Dose:  20 mg   Start taking on:  10/23/2018   Take 1 tablet (20 mg) by mouth 2 times daily   Quantity:  60 tablet   Refills:  0       * Notice:  This list has 5 medication(s) that are the same as other medications prescribed for you. Read the directions carefully, and ask your doctor or other care provider to review them with you.      Stop taking these medicines if you haven't already. Please contact  your care team if you have questions.     clonazePAM 2 MG tablet   Commonly known as:  klonoPIN   Stopped by:  Tyler Oropeza PA-C                Where to get your medicines      These medications were sent to [facility] [address]  [address] [facility] [address]     Phone:  [phone]     cyclobenzaprine 10 MG tablet    DULoxetine 30 MG EC capsule    finasteride 1 MG tablet    nabumetone 750 MG tablet         Some of these will need a paper prescription and others can be bought over the counter.  Ask your nurse if you have questions.     Bring a paper prescription for each of these medications     amphetamine-dextroamphetamine 20 MG per tablet    amphetamine-dextroamphetamine 20 MG per tablet    amphetamine-dextroamphetamine 20 MG per tablet    LORazepam 0.5 MG tablet                Primary Care Provider Fax #    Physician No Ref-Primary [phone]       No address on file        Equal Access to Services     [facility] : Hadii du brown Socorrine, waaxda luqadaha, qaybta kaalmada adesofiyaalan, marisa can . So [facility] [phone].    ATENCIÓN: Si habla español, tiene a gaona disposición servicios gratuitos de asistencia lingüística. Maeve al [phone].    We comply with applicable federal civil rights laws and [place] laws. We do not discriminate on the basis of race, color, national origin, age, disability, sex, sexual orientation, or gender identity.            Thank you!     Thank you for choosing [facility]  for your care. Our goal is always to provide you with excellent care. Hearing back from our patients is one way we can continue to improve our services. Please take a few minutes to complete the written survey that you may receive in the mail after your visit with us. Thank you!             Your Updated Medication List - Protect others around you: Learn how to safely use, store and throw away  your medicines at www.disposemymeds.org.          This list is accurate as of 8/24/18 10:25 AM.  Always use your most recent med list.                   Brand Name Dispense Instructions for use Diagnosis    * ADDERALL XR 20 MG per 24 hr capsule   Generic drug:  amphetamine-dextroamphetamine      Take 20 mg by mouth daily        * ADDERALL 20 MG per tablet   Generic drug:  amphetamine-dextroamphetamine      Take 20 mg by mouth daily        * amphetamine-dextroamphetamine 20 MG per tablet    ADDERALL    60 tablet    Take 1 tablet (20 mg) by mouth 2 times daily    Attention deficit hyperactivity disorder (ADHD), combined type       * amphetamine-dextroamphetamine 20 MG per tablet   Start taking on:  9/23/2018    ADDERALL    60 tablet    Take 1 tablet (20 mg) by mouth 2 times daily    Attention deficit hyperactivity disorder (ADHD), combined type       * amphetamine-dextroamphetamine 20 MG per tablet   Start taking on:  10/23/2018    ADDERALL    60 tablet    Take 1 tablet (20 mg) by mouth 2 times daily    Attention deficit hyperactivity disorder (ADHD), combined type       cyclobenzaprine 10 MG tablet    FLEXERIL    30 tablet    Take 0.5-1 tablets (5-10 mg) by mouth daily as needed for muscle spasms    Left-sided low back pain without sciatica, unspecified chronicity       DULoxetine 30 MG EC capsule    CYMBALTA    60 capsule    Take 1 capsule (30 mg) daily for 1-2 weeks. Then take 2 capsules (60 mg) daily.    Adjustment disorder with mixed anxiety and depressed mood       finasteride 1 MG tablet    PROPECIA    30 tablet    Take 1 tablet (1 mg) by mouth daily    Alopecia       LORazepam 0.5 MG tablet    ATIVAN    30 tablet    Take 1 tablet (0.5 mg) by mouth every 8 hours as needed for anxiety    Adjustment disorder with mixed anxiety and depressed mood       nabumetone 750 MG tablet    RELAFEN    60 tablet    Take 1 tablet (750 mg) by mouth 2 times daily    Acute midline low back pain without sciatica       zolpidem 10  MG tablet    AMBIEN    30 tablet    Take 0.5-1 tablets (5-10 mg) by mouth nightly as needed for sleep    PTSD (post-traumatic stress disorder)       * Notice:  This list has 5 medication(s) that are the same as other medications prescribed for you. Read the directions carefully, and ask your doctor or other care provider to review them with you.

## 2018-08-24 NOTE — LETTER
[facility]  [address] [place], [place], [address]  [phone]        August 31, 2018    Shiv Amador                                                                                                                                                       [address] [facility] [address]            Dear Shiv,    We have tried contacting you multiple times but have been unsuccessful. Please contact us to update your information.     Jamie Oropeza PA-C has reviewed your medication regarding your medication and this was his note:    0.5 mg is a standard dose I recommend  Giving this some time with the cymbalta. Hopefully within a month, ativan is not even needed any more.       Please let us know if you have any questions or concerns.    Sincerely,    [facility]/CHARLEE Davenport

## 2018-08-24 NOTE — TELEPHONE ENCOUNTER
"Pt reports he slipped on wet floor at [facility] just within past 30 min. He twisted his L knee when this happened and this has resulted in pain in L knee. Pain is now 3 out of 10 severity. Hx of arthroscopic knee surgery bilat. No total knee replacement. He states the knee feels swollen. Pt states the knee does not look crooked or deformed to him. He is able to walk and bear weight but doing so increases pain. CMS intact (can't observe foot now as he is still in the store but no numbness, weakness, loss of use) Advised see provider within 3 days per Knee Injury guideline. Discussed guideline home care advice. Advised call back if new/worse sx. Pt voiced understanding and agreement. Rosie Chinchilla RN/FNA      Reason for Disposition    [1] Limp when walking AND [2] due to a twisted knee    Additional Information    Negative: Serious injury with multiple fractures    Negative: [1] Major bleeding (e.g., actively dripping or spurting) AND [2] can't be stopped    Negative: Looks like a dislocated joint or knee cap (crooked or deformed)    Negative: Bullet wound, stabbed by knife, or other serious penetrating wound    Negative: Sounds like a life-threatening emergency to the triager    Negative: Wound looks infected    Negative: Can't stand (bear weight) or walk    Negative: Skin is split open or gaping  (or length > 1/2 inch or 12 mm)    Negative: [1] Bleeding AND [2] won't stop after 10 minutes of direct pressure (using correct technique)    Negative: [1] Dirt in the wound AND [2] not removed with 15 minutes of scrubbing    Negative: Sounds like a serious injury to the triager    Negative: [1] SEVERE pain AND [2] not improved 2 hours after pain medicine/ice packs    Negative: Suspicious history for the injury    Negative: [1] High-risk adult (e.g., age > 60, osteoporosis, chronic steroid use) AND [2] limping    Negative: A \"snap\" or \"pop\" was heard at the time of injury    Negative: Large swelling or bruise (> 2 inches or 5 " cm)    Protocols used: KNEE INJURY-ADULT-AH

## 2018-08-24 NOTE — PATIENT INSTRUCTIONS
Treating Anxiety Disorders with Medicine  An anxiety disorder can make you feel nervous or apprehensive, even without a clear reason. In people age 65 and older, generalized anxiety disorder is one of the most commonly diagnosed anxiety disorders. Many times it occurs with depression. Certain anxiety disorders can cause intense feelings of fear or panic. You may even have physical symptoms such as a racing heartbeat, sweating, or dizziness. If you have these feelings, you don t have to suffer anymore. Treatment to help you overcome your fears will likely include therapy (also called counseling). Medicine may also be prescribed to help control your symptoms.    Medicines  Certain medicines may be prescribed to help control your symptoms. So you may feel less anxious. You may also feel able to move forward with therapy. At first, medicines and dosages may need to be adjusted to find what works best for you. Try to be patient. Tell your healthcare provider how a medicine makes you feel. This way, you can work together to find the treatment that s best for you. Keep in mind that medicines can have side effects. Talk with your provider about any side effects that are bothering you. Changing the dose or type of medicine may help. Don t stop taking medicine on your own. That can cause symptoms to come back.    Anti-anxiety medicine. This medicine eases symptoms and helps you relax. Your healthcare provider will explain when and how to use it. It may be prescribed for use before situations that make you anxious. You may also be told to take medicine on a regular schedule. Anti-anxiety medicine may make you feel a little sleepy or  out of it.  Don t drive a car or operate machinery while on this medicine, until you know how it affects you.  Caution  Never use alcohol or other drugs with anti-anxiety medicines. This could result in loss of muscular control, sedation, coma, or death. Also, use only the amount of medicine  prescribed for you. If you think you may have taken too much, get emergency care right away.     Antidepressant medicine. This kind of medicine is often used to treat anxiety, even if you aren t depressed. An antidepressant helps balance out brain chemicals. This helps keep anxiety under control. This medicine is taken on a schedule. It takes a few weeks to start working. If you don t notice a change at first, you may just need more time. But if you don t notice results after the first few weeks, tell your provider.  Keep taking medicines as prescribed  Never change your dosage, share or use another person's medicine, or stop taking your medicines without talking to your healthcare provider first. Keep the following in mind:    Some medicines must be taken on a schedule. Make this part of your daily routine. For instance, always take your pill before brushing your teeth. A pillbox can help you remember if you ve taken your medicine each day.    Medicines are often taken for 6 to 12 months. Your healthcare provider will then evaluate whether you need to stay on them. Many people who have also had therapy may no longer need medicine to manage anxiety.    You may need to stop taking medicine slowly to give your body time to adjust. When it s time to stop, your healthcare provider will tell you more. Remember: Never stop taking your medicine without talking to your provider first.    If symptoms return, you may need to start taking medicines again. This isn t your fault. It s just the nature of your anxiety disorder.  Special concerns    Side effects. Medicines may cause side effects. Ask your healthcare provider or pharmacist what you can expect. They may have ideas for avoiding some side effects.    Sexual problems. Some antidepressants can affect your desire for sex or your ability to have an orgasm. A change in dosage or medicine often solves the problem. If you have a sexual side effect that concerns you, tell your  healthcare provider.    Addiction. If you ve never had a problem with drugs or alcohol, you may not have a problem with medicines used to treat anxiety disorders. But always discuss the medicines with your healthcare provider before taking them. If you have a history of addiction, you may not be able to use certain medicines used to treat anxiety disorders.    Medicine interactions. Always check with your pharmacist before using any over-the-counter medicines, including herbal supplements.   Date Last Reviewed: 5/1/2017 2000-2017 [employer]. [address] [place] [address]. All rights reserved. This information is not intended as a substitute for professional medical care. Always follow your healthcare professional's instructions.

## 2018-08-24 NOTE — PROGRESS NOTES
"  SUBJECTIVE:   Shiv Amador is a 47 year old male who presents to clinic today for the following health issues:      Medication Followup of adderall    Taking Medication as prescribed: NO-patient stopped meds back in March-wanting to discuss starting again. However, interested in restarting 20 mg regular release BID instead of xr in the AM and standard in the PM.     Side Effects:  None    Medication Helping Symptoms:  yes       Depression and Anxiety Follow-Up    Status since last visit: anxiety worsened     Other associated symptoms:None    Complicating factors:     Significant life event: yes     Current substance abuse: None  -wanting to discuss starting xanax again. Was changed to klonopin in the past by psych. Not working as well. Also states used zoloft, celexa, and prozac in the past, but did not improve symptoms.     Psychiatrist retired and would like me to take over prescribing role.     No flowsheet data found.  No flowsheet data found.  In the past two weeks have you had thoughts of suicide or self-harm?  No.    Do you have concerns about your personal safety or the safety of others?   No  PHQ-9  English  PHQ-9   Any Language  KYLIE-7  Suicide Assessment Five-step Evaluation and Treatment (SAFE-T)          Back Pain       Duration: years        Specific cause: injury     Description:   Location of pain: low back left  Character of pain: \"locks up\"  Pain radiation:none  New numbness or weakness in legs, not attributed to pain:  no     Intensity: Currently 0/10, at its worst 10/10 about 2x per years    History:   Pain interferes with job: YES at times  History of back problems: YES  Any previous MRI or X-rays: None  Sees a specialist for back pain:  No  Therapies tried without relief:     Alleviating factors:   Improved by: flexeril and relafen      Precipitating factors:  Worsened by: any movement when it does happen    Functional and Psychosocial Screen (Dulce STarT Back):      Not performed " today          Accompanying Signs & Symptoms:  Risk of Fracture:  None  Risk of Cauda Equina:  None  Risk of Infection:  None  Risk of Cancer:  None  Risk of Ankylosing Spondylitis:  Onset at age <35, male, AND morning back stiffness. no                  Problem list and histories reviewed & adjusted, as indicated.  Additional history: as documented    Patient Active Problem List   Diagnosis     Adjustment disorder with mixed anxiety and depressed mood     Obstructive sleep apnea     Sciatica     CARDIOVASCULAR SCREENING; LDL GOAL LESS THAN 160     Elevated LFTs     PTSD (post-traumatic stress disorder)     Controlled substance agreement signed     Acute midline low back pain without sciatica     Deviated nasal septum     Past Surgical History:   Procedure Laterality Date     HC KNEE SCOPE, W/LATERAL RELEASE         Social History   Substance Use Topics     Smoking status: Never Smoker     Smokeless tobacco: Never Used      Comment: 2-5 cig every couple     Alcohol use No     Family History   Problem Relation Age of Onset     HEART DISEASE Father      heart attack  age 67     Respiratory Father      emphyzema     Back Pain Father      7 surgeries     Family History Negative Sister      Family History Negative Sister      Family History Negative Brother          Current Outpatient Prescriptions   Medication Sig Dispense Refill     amphetamine-dextroamphetamine (ADDERALL) 20 MG per tablet Take 1 tablet (20 mg) by mouth 2 times daily 60 tablet 0     [START ON 2018] amphetamine-dextroamphetamine (ADDERALL) 20 MG per tablet Take 1 tablet (20 mg) by mouth 2 times daily 60 tablet 0     [START ON 10/23/2018] amphetamine-dextroamphetamine (ADDERALL) 20 MG per tablet Take 1 tablet (20 mg) by mouth 2 times daily 60 tablet 0     cyclobenzaprine (FLEXERIL) 10 MG tablet Take 0.5-1 tablets (5-10 mg) by mouth daily as needed for muscle spasms 30 tablet 2     DULoxetine (CYMBALTA) 30 MG EC capsule Take 1 capsule (30 mg)  daily for 1-2 weeks. Then take 2 capsules (60 mg) daily. 60 capsule 1     finasteride (PROPECIA) 1 MG tablet Take 1 tablet (1 mg) by mouth daily 30 tablet 5     LORazepam (ATIVAN) 0.5 MG tablet Take 1 tablet (0.5 mg) by mouth every 8 hours as needed for anxiety 30 tablet 0     nabumetone (RELAFEN) 750 MG tablet Take 1 tablet (750 mg) by mouth 2 times daily 60 tablet 1     [DISCONTINUED] nabumetone (RELAFEN) 750 MG tablet Take 1 tablet (750 mg) by mouth 2 times daily (Patient not taking: Reported on 8/24/2018) 60 tablet 1     No Known Allergies  BP Readings from Last 3 Encounters:   08/24/18 137/84   04/18/18 123/72   07/21/17 118/73    Wt Readings from Last 3 Encounters:   08/24/18 209 lb (94.8 kg)   07/21/17 214 lb (97.1 kg)   07/06/17 212 lb 11.2 oz (96.5 kg)                    Reviewed and updated as needed this visit by clinical staff  Tobacco  Allergies  Meds  Problems  Med Hx  Surg Hx  Fam Hx  Soc Hx        Reviewed and updated as needed this visit by Provider  Allergies  Meds  Problems         ROS:  Constitutional, neuro, psych, cardiovascular, pulmonary, gi and gu systems are negative, except as otherwise noted.    OBJECTIVE:     /84 (BP Location: Right arm, Patient Position: Chair, Cuff Size: Adult Large)  Pulse 86  Temp 97.9  F (36.6  C) (Oral)  Resp 22  Ht 6' (1.829 m)  Wt 209 lb (94.8 kg)  BMI 28.35 kg/m2  Body mass index is 28.35 kg/(m^2).  GENERAL: healthy, alert and no distress  RESP: lungs clear to auscultation - no rales, rhonchi or wheezes  CV: regular rates and rhythm, normal S1 S2, no S3 or S4 and no murmur, click or rub  PSYCH: mentation appears normal, inattentive, tangential and affect normal/bright    Diagnostic Test Results:  none     ASSESSMENT/PLAN:     (F90.2) Attention deficit hyperactivity disorder (ADHD), combined type  (primary encounter diagnosis)  Comment: history of this. Obvious on exam. Unable to remain focused. I am fine managing ADD. Will restart 20 mg IR  bid and follow up in 3 months. Discussed [place] guidelines. CSA updated and urine tox pending.   Plan: amphetamine-dextroamphetamine (ADDERALL) 20 MG         per tablet, amphetamine-dextroamphetamine         (ADDERALL) 20 MG per tablet,         amphetamine-dextroamphetamine (ADDERALL) 20 MG         per tablet, Drug  Screen Comprehensive, Urine         w/o Reported Meds (Pain Care Package)        -Medication use and side effects discussed with the patient. Patient is in complete understanding and agreement with plan.       (Z79.899) Controlled substance agreement signed  Comment: as above   Plan: Drug  Screen Comprehensive, Urine w/o Reported         Meds (Pain Care Package)            (F43.23) Adjustment disorder with mixed anxiety and depressed mood  Comment: history of this. Do not agree with chronic management of benzos only due to addictive behavior. Also took ambein in the past. Would not prescribe both together due to CNS depression. Discussed cymbalta given recurrent back pain and history of failing ssri. Will attempt this. Short course of ativan given.   Plan: DULoxetine (CYMBALTA) 30 MG EC capsule,         LORazepam (ATIVAN) 0.5 MG tablet, Drug  Screen         Comprehensive, Urine w/o Reported Meds (Pain         Care Package)        -Medication use and side effects discussed with the patient. Patient is in complete understanding and agreement with plan.       (M54.5) Acute midline low back pain without sciatica  Comment: see above. relafen can be continued. However, discussed medex program as alternative as well.   Plan: nabumetone (RELAFEN) 750 MG tablet        -Medication use and side effects discussed with the patient. Patient is in complete understanding and agreement with plan.       (M54.5) Left-sided low back pain without sciatica, unspecified chronicity  Comment: as above   Plan: cyclobenzaprine (FLEXERIL) 10 MG tablet,         PHYSIATRY REFERRAL            (L65.9) Alopecia  Comment:   Plan:  finasteride (PROPECIA) 1 MG tablet              Follow up: as above     Tyler Oropeza PA-C  [facility]

## 2018-08-25 ASSESSMENT — PATIENT HEALTH QUESTIONNAIRE - PHQ9: SUM OF ALL RESPONSES TO PHQ QUESTIONS 1-9: 12

## 2018-08-27 NOTE — TELEPHONE ENCOUNTER
Tried calling patient-no answer/recording stating patient is not accepting calls at this time. Will try back later.

## 2018-08-27 NOTE — TELEPHONE ENCOUNTER
0.5 mg is a standard dose. I recommend  Giving this some time with the cymbalta. Hopefully within a month, ativan is not even needed any more.     -Jamie Oropeza, PAC

## 2018-08-28 ENCOUNTER — TELEPHONE (OUTPATIENT)
Dept: FAMILY MEDICINE | Facility: CLINIC | Age: 48
End: 2018-08-28

## 2018-08-28 DIAGNOSIS — Z91.148 CONTROLLED SUBSTANCE AGREEMENT BROKEN: ICD-10-CM

## 2018-08-28 LAB — COMPREHEN DRUG ANALYSIS UR: NORMAL

## 2018-08-28 NOTE — TELEPHONE ENCOUNTER
Outgoing message:  The person you are trying to reach is not accepting calls at this time, please rry again later.   Favio Brannon RN

## 2018-08-28 NOTE — TELEPHONE ENCOUNTER
Please call patient. Urine tox has returned. Unfortunately this has returned positive for both THC (found in marijuana) and methamphetamines. Both of these are illegal substances federally and I cannot continue to prescribe controlled substances for patient in the future. This includes his adderall and Ativan. I am more than happy to continue to see patient for his ongoing health. However, no further controlled substances will be prescribed by me.    -Jamie Oropeza, GONZALO      Of note, for documentation purposes. I have discussed with pharmacy. Has future refills on file. I informed them to destroy these due to failed CSA.     -Jamie Oropeza, PAC

## 2018-08-28 NOTE — LETTER
August 29, 2018          Shiv Amador  [address] [address]        Dear Shiv,     Your August 24 urine drug screen lab results are in. Unfortunately, this has returned positive for both THC (found in marijuana) and methamphetamines. Both of these are illegal substances federally and I cannot continue to prescribe controlled substances for you in the future. This includes amphetamine-dextroamphetamine (ADDERALL) and lorazepam (ATIVAN).     I am more than happy to continue to see your for your ongoing health. However, no further controlled substances will be prescribed by me.    Sincerely,        Tyler Oropeza PA-C

## 2018-08-29 NOTE — TELEPHONE ENCOUNTER
Same outgoing message today, we are unable to leave a message.    Jamie, OK if we mail a letter?  Letter t'd up. Will you edit/print/sign?   Favio Brannon RN

## 2018-08-30 NOTE — TELEPHONE ENCOUNTER
Attempted to reach patient by phone,  states not accepting calls at this time    Chelo Espinosa/CMA  [place]

## 2018-09-13 DIAGNOSIS — F43.23 ADJUSTMENT DISORDER WITH MIXED ANXIETY AND DEPRESSED MOOD: ICD-10-CM

## 2018-09-13 RX ORDER — LORAZEPAM 0.5 MG/1
0.5 TABLET ORAL EVERY 8 HOURS PRN
Qty: 30 TABLET | Refills: 0 | OUTPATIENT
Start: 2018-09-13

## 2018-09-13 NOTE — TELEPHONE ENCOUNTER
Pt calls, wants lorazepam increase and/or stronger dose/or refill, has 3 pills left, takes anywhere from 1-3 per day, confirms taking cymbalta 60 mg every day,  routed aware ZB out and will process 9/14    (FYI--need problem list updated for att def, add diagnosis and overview if ongoing rx)    Controlled Substance Refill Request for lorazepam  Problem List Complete:  No     PROVIDER TO CONSIDER COMPLETION OF PROBLEM LIST AND OVERVIEW/CONTROLLED SUBSTANCE AGREEMENT    Last Written Prescription Date:  8/24/18  Last Fill Quantity: 30,   # refills: 0    Last Office Visit with [facility] primary care provider: 8/24/18    Future Office visit:     Controlled substance agreement on file: Yes:  Date 8/29/18.     Processing:  Fax/walk over to pharmacy in building   checked in past 3 months?  Yes 9/13/18-in your bin  Dorene Swain, RN, BSN  Message handled by Nurse Triage.

## 2018-09-13 NOTE — TELEPHONE ENCOUNTER
Controlled substance agreement was broken due to positive urine tox. We were unable to get a hold of him to discuss this last month and sent a letter explaining this. I can no longer prescribed any controlled substance to patient.     -Jamie Oropeza, PaC

## 2018-09-13 NOTE — LETTER
September 17, 2018          Shiv Amador  [address] [address]        Dear Shiv,     We are unable to reach you by telephone [phone] regarding September 13 refill request for lorazepam (a controlled substance).     Jamie replied:   Your August 24 lab results showed Methamphetamine was also present. Also, THC is not found in CBD oil and would not cause false positive. Further controlled substances will not be filled.     Please call our office if questions.     Sincerely,        Tyler Oropeza PA-C

## 2018-09-14 NOTE — TELEPHONE ENCOUNTER
Patient Contact    Attempt # 1    Was call answered?  No.  Not accepting calls at this time  Dorene Swain RN, BSN  Message handled by Nurse Triage.

## 2018-09-15 NOTE — TELEPHONE ENCOUNTER
Pt states he has been using a cannabis oil on his knees for pain.  This is something he purchased over the counter.       He states he did not receive either of the letters sent or any phone calls.  Writer did review information in letters and review his contact information     Pt states he is willing to come back and in re test if needed.      Please advise     Kiley Cerda RN

## 2018-09-17 NOTE — TELEPHONE ENCOUNTER
"We called [phone] \"The person you are trying to reach is not accepting calls at this time.\"  Letter mailed.    I recall Jamie signed 8/29/18 letter. He signed today's letter too.  Favio Brannon RN          "

## 2018-09-17 NOTE — TELEPHONE ENCOUNTER
Methamphetamine was also present. Also, THC is not found in CBD oil and would not cause false positive. Further controlled substances will not be filled.     -Jamie Oropeza, PAC

## 2018-09-17 NOTE — TELEPHONE ENCOUNTER
Pt calls, discussed below, provided new number, updated [phone] (home), again informed of ZB response, ZB see note under FYI tab (printed in your in box, you may have already seen this)  Dorene Swain RN, BSN  Message handled by Nurse Triage.

## 2018-10-10 ENCOUNTER — TELEPHONE (OUTPATIENT)
Dept: FAMILY MEDICINE | Facility: CLINIC | Age: 48
End: 2018-10-10

## 2018-10-11 NOTE — TELEPHONE ENCOUNTER
Paperwork for medical opinion for disability. I am not aware of a specific condition patient is referring to. Will need visit to complete form. If physical disability, needs OV for exam.     -Jamie Oropeza, PAC

## 2018-10-22 NOTE — TELEPHONE ENCOUNTER
Patient states he was unaware they were going to send us the form. Patient states we can disregard form.  From placed in shred it bin.

## 2018-10-25 ENCOUNTER — OFFICE VISIT (OUTPATIENT)
Dept: FAMILY MEDICINE | Facility: CLINIC | Age: 48
End: 2018-10-25
Payer: COMMERCIAL

## 2018-10-25 ENCOUNTER — TELEPHONE (OUTPATIENT)
Dept: FAMILY MEDICINE | Facility: CLINIC | Age: 48
End: 2018-10-25

## 2018-10-25 VITALS
RESPIRATION RATE: 18 BRPM | WEIGHT: 192 LBS | HEART RATE: 92 BPM | BODY MASS INDEX: 26.04 KG/M2 | DIASTOLIC BLOOD PRESSURE: 76 MMHG | SYSTOLIC BLOOD PRESSURE: 124 MMHG

## 2018-10-25 DIAGNOSIS — F98.8 ATTENTION DEFICIT DISORDER, UNSPECIFIED HYPERACTIVITY PRESENCE: Primary | ICD-10-CM

## 2018-10-25 DIAGNOSIS — L65.9 ALOPECIA: ICD-10-CM

## 2018-10-25 DIAGNOSIS — R34 OLIGURIA: ICD-10-CM

## 2018-10-25 DIAGNOSIS — F90.9 ATTENTION DEFICIT HYPERACTIVITY DISORDER (ADHD), UNSPECIFIED ADHD TYPE: ICD-10-CM

## 2018-10-25 DIAGNOSIS — F43.23 ADJUSTMENT DISORDER WITH MIXED ANXIETY AND DEPRESSED MOOD: Primary | ICD-10-CM

## 2018-10-25 DIAGNOSIS — F15.10 AMPHETAMINE USE DISORDER, MILD (H): ICD-10-CM

## 2018-10-25 DIAGNOSIS — F12.90 MARIJUANA USE: ICD-10-CM

## 2018-10-25 DIAGNOSIS — Z91.148 CONTROLLED SUBSTANCE AGREEMENT BROKEN: ICD-10-CM

## 2018-10-25 PROCEDURE — 99214 OFFICE O/P EST MOD 30 MIN: CPT | Performed by: PHYSICIAN ASSISTANT

## 2018-10-25 RX ORDER — ARIPIPRAZOLE 5 MG/1
5 TABLET ORAL AT BEDTIME
Qty: 30 TABLET | Refills: 1 | Status: SHIPPED | OUTPATIENT
Start: 2018-10-25 | End: 2018-10-26

## 2018-10-25 RX ORDER — TAMSULOSIN HYDROCHLORIDE 0.4 MG/1
0.4 CAPSULE ORAL DAILY
Qty: 30 CAPSULE | Refills: 1 | Status: SHIPPED | OUTPATIENT
Start: 2018-10-25

## 2018-10-25 RX ORDER — ATOMOXETINE 40 MG/1
CAPSULE ORAL
Qty: 60 CAPSULE | Refills: 1 | Status: SHIPPED | OUTPATIENT
Start: 2018-10-25 | End: 2018-10-29

## 2018-10-25 RX ORDER — DULOXETIN HYDROCHLORIDE 60 MG/1
60 CAPSULE, DELAYED RELEASE ORAL DAILY
Qty: 90 CAPSULE | Refills: 1 | Status: SHIPPED | OUTPATIENT
Start: 2018-10-25

## 2018-10-25 RX ORDER — FINASTERIDE 1 MG/1
1 TABLET, FILM COATED ORAL DAILY
Qty: 30 TABLET | Refills: 5 | Status: SHIPPED | OUTPATIENT
Start: 2018-10-25

## 2018-10-25 ASSESSMENT — ANXIETY QUESTIONNAIRES
3. WORRYING TOO MUCH ABOUT DIFFERENT THINGS: MORE THAN HALF THE DAYS
GAD7 TOTAL SCORE: 15
6. BECOMING EASILY ANNOYED OR IRRITABLE: NEARLY EVERY DAY
GAD7 TOTAL SCORE: 15
7. FEELING AFRAID AS IF SOMETHING AWFUL MIGHT HAPPEN: MORE THAN HALF THE DAYS
5. BEING SO RESTLESS THAT IT IS HARD TO SIT STILL: MORE THAN HALF THE DAYS
GAD7 TOTAL SCORE: 15
1. FEELING NERVOUS, ANXIOUS, OR ON EDGE: MORE THAN HALF THE DAYS
4. TROUBLE RELAXING: MORE THAN HALF THE DAYS
2. NOT BEING ABLE TO STOP OR CONTROL WORRYING: MORE THAN HALF THE DAYS
7. FEELING AFRAID AS IF SOMETHING AWFUL MIGHT HAPPEN: MORE THAN HALF THE DAYS

## 2018-10-25 ASSESSMENT — PATIENT HEALTH QUESTIONNAIRE - PHQ9
10. IF YOU CHECKED OFF ANY PROBLEMS, HOW DIFFICULT HAVE THESE PROBLEMS MADE IT FOR YOU TO DO YOUR WORK, TAKE CARE OF THINGS AT HOME, OR GET ALONG WITH OTHER PEOPLE: VERY DIFFICULT
SUM OF ALL RESPONSES TO PHQ QUESTIONS 1-9: 16
SUM OF ALL RESPONSES TO PHQ QUESTIONS 1-9: 16

## 2018-10-25 NOTE — PROGRESS NOTES
"  SUBJECTIVE:   Shiv Amador is a 47 year old male who presents to clinic today for the following health issues:    History of Present Illness     Depression & Anxiety Follow-up:     Depression/Anxiety:  Depression & Anxiety    Status since last visit::  Worsened    Other associated symptoms of depression and anxiety::  YES    Significant life event::  YES    Current substance use::  Prescription Drugs       Today's PHQ-9         PHQ-9 Total Score:     (P) 16   PHQ-9 Q9 Suicidal ideation:   (P) Several days   Thoughts of suicide or self harm:  (P) No   Self-harm Plan:        Self-harm Action:          Safety concerns for self or others: (P) No     KYLIE-7 Total Score: (P) 15  Frequency of exercise:  None  Taking medications regularly:  Yes  Additional concerns today:  No      Problem list and histories reviewed & adjusted, as indicated.  Additional history: as documented    States anxiety and depression has worsened. 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 Has another job, but is paid on commission for the first year. Currently is homeless living in hotels. Tried living with another friend who he found going through his stuff so he left.     He also states since being off of his adderall, his attention is greatly impaired. He also states he continues to suffer from insomnia and would like to restart his adderall and ativan if possible. Of note, patient had urine tox at last visit where evidence of methamphetamine and thc were present. Today, he admits to smoking meth with friends at a party. States he \"might have had a pot brownie\" as well during this time.     Of note, patient states for 2 months has had episodes of painful urination and troubles initiating urine intermittently. No current symptoms. Denies chance for STIs. No fever or chills. No known family history of prostate concerns or cancers. Does admit to taking daily " antihistamines and recent cold medications for current sinus cold.     Patient Active Problem List   Diagnosis     Adjustment disorder with mixed anxiety and depressed mood     Obstructive sleep apnea     Sciatica     CARDIOVASCULAR SCREENING; LDL GOAL LESS THAN 160     Elevated LFTs     PTSD (post-traumatic stress disorder)     Controlled substance agreement broken     Acute midline low back pain without sciatica     Deviated nasal septum     Amphetamine use disorder, mild (H)     Marijuana use     Past Surgical History:   Procedure Laterality Date     HC KNEE SCOPE, W/LATERAL RELEASE         Social History   Substance Use Topics     Smoking status: Never Smoker     Smokeless tobacco: Never Used      Comment: 2-5 cig every couple     Alcohol use No     Family History   Problem Relation Age of Onset     HEART DISEASE Father      heart attack  age 67     Respiratory Father      emphyzema     Back Pain Father      7 surgeries     Family History Negative Sister      Family History Negative Sister      Family History Negative Brother          Current Outpatient Prescriptions   Medication Sig Dispense Refill     amphetamine-dextroamphetamine (ADDERALL) 20 MG per tablet Take 1 tablet (20 mg) by mouth 2 times daily 60 tablet 0     ARIPiprazole (ABILIFY) 5 MG tablet Take 1 tablet (5 mg) by mouth At Bedtime 30 tablet 1     atomoxetine (STRATTERA) 40 MG capsule Take 1 cap (40 mg) daily for at least 3 days. May then increase to 2 caps (80 mg) daily. 60 capsule 1     cyclobenzaprine (FLEXERIL) 10 MG tablet Take 0.5-1 tablets (5-10 mg) by mouth daily as needed for muscle spasms 30 tablet 2     DULoxetine (CYMBALTA) 30 MG EC capsule Take 1 capsule (30 mg) daily for 1-2 weeks. Then take 2 capsules (60 mg) daily. 60 capsule 1     DULoxetine (CYMBALTA) 60 MG EC capsule Take 1 capsule (60 mg) by mouth daily 90 capsule 1     finasteride (PROPECIA) 1 MG tablet Take 1 tablet (1 mg) by mouth daily 30 tablet 5     LORazepam (ATIVAN)  0.5 MG tablet Take 1 tablet (0.5 mg) by mouth every 8 hours as needed for anxiety 30 tablet 0     nabumetone (RELAFEN) 750 MG tablet Take 1 tablet (750 mg) by mouth 2 times daily 60 tablet 1     tamsulosin (FLOMAX) 0.4 MG capsule Take 1 capsule (0.4 mg) by mouth daily 30 capsule 1     No Known Allergies  BP Readings from Last 3 Encounters:   10/25/18 124/76   08/24/18 137/84   04/18/18 123/72    Wt Readings from Last 3 Encounters:   10/25/18 192 lb (87.1 kg)   08/24/18 209 lb (94.8 kg)   07/21/17 214 lb (97.1 kg)                    ROS:  Constitutional, HEENT, psych, neuro, cardiovascular, pulmonary, gi and gu systems are negative, except as otherwise noted.    OBJECTIVE:     /76 (BP Location: Right arm, Patient Position: Chair, Cuff Size: Adult Large)  Pulse 92  Resp 18  Wt 192 lb (87.1 kg)  BMI 26.04 kg/m2  Body mass index is 26.04 kg/(m^2).  GENERAL: alert and no distress  RESP: lungs clear to auscultation - no rales, rhonchi or wheezes  CV: regular rates and rhythm, normal S1 S2, no S3 or S4 and no murmur, click or rub  NEURO: Normal strength and tone, mentation intact and speech normal  PSYCH: mentation appears normal, tangential and anxious    Diagnostic Test Results:  none     ASSESSMENT/PLAN:     (F43.23) Adjustment disorder with mixed anxiety and depressed mood  (primary encounter diagnosis)  Comment: worsening symptoms. Likely situational. Recommending maintaining on current cymbalta dose and also addition of abilify nightly. In terms of ativan request, given history of illicit drug use and past alcoholism, I do not recommend starting this. Will have see addiction medication for second opinion. Follow up with me in 3 months. Of note, patient admits to thoughts of better being off dead. However, denies suicidal thoughts or intention. Verbally agrees to go to ER if thoughts occur.   Plan: DULoxetine (CYMBALTA) 60 MG EC capsule,         ARIPiprazole (ABILIFY) 5 MG tablet, ADDICTION         MEDICINE  "REFERRAL        -Medication use and side effects discussed with the patient. Patient is in complete understanding and agreement with plan.       (F90.9) Attention deficit hyperactivity disorder (ADHD), unspecified ADHD type  Comment: see above. Again, will not give controlled stimulants given substance use. Will attempt strattera. Follow up in 3 months and per addiction medicine.   Plan: atomoxetine (STRATTERA) 40 MG capsule,         ADDICTION MEDICINE REFERRAL        -Medication use and side effects discussed with the patient. Patient is in complete understanding and agreement with plan.       (L65.9) Alopecia  Comment: requesting refill.   Plan: finasteride (PROPECIA) 1 MG tablet            (R34) Oliguria  Comment: history of this. Unclear cause. Maybe anticholinergic side effects from cold and allergy medications. However, prostatitis is possible. Both infectious and non infectious discussed. Recommending testing for this. Patient denies today. States \"does not feel like it\" at this time. Also discussed possible prostate cancer presentation and recommended psa evaluation. Patient states will return on Monday for lab only. Future order placed. flomax used daily for symptoms.   Plan: Prostate spec antigen screen, *UA reflex to         Microscopic and Culture ([place] and [facility] (except [place] and [place]),         Chlamydia trachomatis PCR, Neisseria         gonorrhoeae PCR, CANCELED: Chlamydia         trachomatis PCR, CANCELED: Neisseria         gonorrhoeae PCR        -Medication use and side effects discussed with the patient. Patient is in complete understanding and agreement with plan.       (Z91.14) Controlled substance agreement broken  Comment:   Plan: ADDICTION MEDICINE REFERRAL              Follow up: as above     Tyler Oropeza PA-C  [facility]  Answers for HPI/ROS submitted by the patient on 10/25/2018   PHQ-2 Score: 2  If you checked off any problems, how " difficult have these problems made it for you to do your work, take care of things at home, or get along with other people?: Very difficult  PHQ9 TOTAL SCORE: 16  KYLIE 7 TOTAL SCORE: 15

## 2018-10-25 NOTE — MR AVS SNAPSHOT
After Visit Summary   10/25/2018    Shiv Amador    MRN: [id number]           Patient Information     Date Of Birth          1970        Visit Information        Provider Department      10/25/2018 1:00 PM Tlyer Oropeza PA-C [facility]        Today's Diagnoses     Adjustment disorder with mixed anxiety and depressed mood    -  1    Attention deficit hyperactivity disorder (ADHD), unspecified ADHD type        Alopecia        Oliguria        Controlled substance agreement broken           Follow-ups after your visit        Additional Services     ADDICTION MEDICINE REFERRAL       The Addiction Medicine Service is prepared to provide consultation for and, if necessary, ongoing care for patients with the disease of addiction, ages 16 and up.     For acute detox, please send your patient to the ER or contact [facility] at [phone].     Common problems that may warrant referral to the Addiction Medicine Service are:  Alcohol use disorder - diagnosis, treatment referral, acute and protracted withdrawal management, and ongoing medication assisted treatment including Antabuse and Naltrexone.  Opioid Use Disorder - medication assisted treatment including Buprenorphine (Suboxone) or extended release Naltrexone (Vivitrol)  Benzodiazepine dependence - extended outpatient detoxification  Many other issues pertaining to addiction, relapse, and recovery    Please contact our scheduling staff at [phone] with any questions.    Referrals to the Addiction Medicine Service assume that the referring provider has discussed the referral with the patient.  Referral will be reviewed and if appropriate, the patient will be contacted to schedule appointment.  If not appropriate, the provider will be contacted with further information.    Please answer the following questions so we can better service your patient:    Drug of choice: alcohol, street drugs  Do they  "have a [facility] PCP:  Yes     Please bring the following to your appointment:  >>   List of current medications   >>   Any relevant history                  Future tests that were ordered for you today     Open Future Orders        Priority Expected Expires Ordered    Prostate spec antigen screen Routine  10/25/2019 10/25/2018    *UA reflex to Microscopic and Culture ([facility] (except [place] and [place]) Routine  10/25/2019 10/25/2018            Who to contact     If you have questions or need follow up information about today's clinic visit or your schedule please contact [facility] directly at [phone].  Normal or non-critical lab and imaging results will be communicated to you by SomnoMedhart, letter or phone within 4 business days after the clinic has received the results. If you do not hear from us within 7 days, please contact the clinic through SomnoMedhart or phone. If you have a critical or abnormal lab result, we will notify you by phone as soon as possible.  Submit refill requests through [employer] or call your pharmacy and they will forward the refill request to us. Please allow 3 business days for your refill to be completed.          Additional Information About Your Visit        MyChart Information     [employer] lets you send messages to your doctor, view your test results, renew your prescriptions, schedule appointments and more. To sign up, go to [email or web address] . Click on \"Log in\" on the left side of the screen, which will take you to the Welcome page. Then click on \"Sign up Now\" on the right side of the page.     You will be asked to enter the access code listed below, as well as some personal information. Please follow the directions to create your username and password.     Your access code is: [email or web address]  Expires: 11/15/2018  8:46 AM     Your access code will  in 90 days. If you need help or a new code, please call your [facility] clinic or " [phone].        Care EveryWhere ID     This is your Care EveryWhere ID. This could be used by other organizations to access your [facility] medical records  [id number]        Your Vitals Were     Pulse Respirations BMI (Body Mass Index)             92 18 26.04 kg/m2          Blood Pressure from Last 3 Encounters:   10/25/18 124/76   08/24/18 137/84   04/18/18 123/72    Weight from Last 3 Encounters:   10/25/18 192 lb (87.1 kg)   08/24/18 209 lb (94.8 kg)   07/21/17 214 lb (97.1 kg)              We Performed the Following     ADDICTION MEDICINE REFERRAL     Chlamydia trachomatis PCR     Neisseria gonorrhoeae PCR          Today's Medication Changes          These changes are accurate as of 10/25/18  1:42 PM.  If you have any questions, ask your nurse or doctor.               Start taking these medicines.        Dose/Directions    ARIPiprazole 5 MG tablet   Commonly known as:  ABILIFY   Used for:  Adjustment disorder with mixed anxiety and depressed mood   Started by:  Tyler Oropeza PA-C        Dose:  5 mg   Take 1 tablet (5 mg) by mouth At Bedtime   Quantity:  30 tablet   Refills:  1       atomoxetine 40 MG capsule   Commonly known as:  STRATTERA   Used for:  Attention deficit hyperactivity disorder (ADHD), unspecified ADHD type   Started by:  Tyler Oropeza PA-C        Take 1 cap (40 mg) daily for at least 3 days. May then increase to 2 caps (80 mg) daily.   Quantity:  60 capsule   Refills:  1         These medicines have changed or have updated prescriptions.        Dose/Directions    * DULoxetine 30 MG EC capsule   Commonly known as:  CYMBALTA   This may have changed:  Another medication with the same name was added. Make sure you understand how and when to take each.   Used for:  Adjustment disorder with mixed anxiety and depressed mood   Changed by:  Tyler Oropeza PA-C        Take 1 capsule (30 mg) daily for 1-2 weeks. Then take 2 capsules (60 mg) daily.   Quantity:  60 capsule    Refills:  1       * DULoxetine 60 MG EC capsule   Commonly known as:  CYMBALTA   This may have changed:  You were already taking a medication with the same name, and this prescription was added. Make sure you understand how and when to take each.   Used for:  Adjustment disorder with mixed anxiety and depressed mood   Changed by:  Tyler Oropeza PA-C        Dose:  60 mg   Take 1 capsule (60 mg) by mouth daily   Quantity:  90 capsule   Refills:  1       * Notice:  This list has 2 medication(s) that are the same as other medications prescribed for you. Read the directions carefully, and ask your doctor or other care provider to review them with you.         Where to get your medicines      [facility] [address]  [address] [facility] [address]     Phone:  [phone]     ARIPiprazole 5 MG tablet    atomoxetine 40 MG capsule    DULoxetine 60 MG EC capsule    finasteride 1 MG tablet                Primary Care Provider Fax #    Physician No Ref-Primary [phone]       No address on file        Equal Access to Services     [facility] : Hadii aad ku hadasho Soomaali, waaxda luqadaha, qaybta kaalmada adeegyaalan, marisa can . So [facility] [phone].    ATENCIÓN: Si habla español, tiene a gaona disposición servicios gratuitos de asistencia lingüística. Llame al [phone].    We comply with applicable federal civil rights laws and [place] laws. We do not discriminate on the basis of race, color, national origin, age, disability, sex, sexual orientation, or gender identity.            Thank you!     Thank you for choosing [facility]  for your care. Our goal is always to provide you with excellent care. Hearing back from our patients is one way we can continue to improve our services. Please take a few minutes to complete the written survey that you may receive in the mail after your  visit with us. Thank you!             Your Updated Medication List - Protect others around you: Learn how to safely use, store and throw away your medicines at www.disposemymeds.org.          This list is accurate as of 10/25/18  1:42 PM.  Always use your most recent med list.                   Brand Name Dispense Instructions for use Diagnosis    amphetamine-dextroamphetamine 20 MG per tablet    ADDERALL    60 tablet    Take 1 tablet (20 mg) by mouth 2 times daily    Attention deficit hyperactivity disorder (ADHD), combined type       ARIPiprazole 5 MG tablet    ABILIFY    30 tablet    Take 1 tablet (5 mg) by mouth At Bedtime    Adjustment disorder with mixed anxiety and depressed mood       atomoxetine 40 MG capsule    STRATTERA    60 capsule    Take 1 cap (40 mg) daily for at least 3 days. May then increase to 2 caps (80 mg) daily.    Attention deficit hyperactivity disorder (ADHD), unspecified ADHD type       cyclobenzaprine 10 MG tablet    FLEXERIL    30 tablet    Take 0.5-1 tablets (5-10 mg) by mouth daily as needed for muscle spasms    Left-sided low back pain without sciatica, unspecified chronicity       * DULoxetine 30 MG EC capsule    CYMBALTA    60 capsule    Take 1 capsule (30 mg) daily for 1-2 weeks. Then take 2 capsules (60 mg) daily.    Adjustment disorder with mixed anxiety and depressed mood       * DULoxetine 60 MG EC capsule    CYMBALTA    90 capsule    Take 1 capsule (60 mg) by mouth daily    Adjustment disorder with mixed anxiety and depressed mood       finasteride 1 MG tablet    PROPECIA    30 tablet    Take 1 tablet (1 mg) by mouth daily    Alopecia       LORazepam 0.5 MG tablet    ATIVAN    30 tablet    Take 1 tablet (0.5 mg) by mouth every 8 hours as needed for anxiety    Adjustment disorder with mixed anxiety and depressed mood       nabumetone 750 MG tablet    RELAFEN    60 tablet    Take 1 tablet (750 mg) by mouth 2 times daily    Acute midline low back pain without sciatica       *  Notice:  This list has 2 medication(s) that are the same as other medications prescribed for you. Read the directions carefully, and ask your doctor or other care provider to review them with you.

## 2018-10-26 ENCOUNTER — TELEPHONE (OUTPATIENT)
Dept: PSYCHOLOGY | Facility: CLINIC | Age: 48
End: 2018-10-26

## 2018-10-26 ENCOUNTER — TELEPHONE (OUTPATIENT)
Dept: ADDICTION MEDICINE | Facility: CLINIC | Age: 48
End: 2018-10-26

## 2018-10-26 DIAGNOSIS — F43.23 ADJUSTMENT DISORDER WITH MIXED ANXIETY AND DEPRESSED MOOD: Primary | ICD-10-CM

## 2018-10-26 RX ORDER — ARIPIPRAZOLE 10 MG/1
5 TABLET ORAL AT BEDTIME
Qty: 90 TABLET | Refills: 0 | Status: SHIPPED | OUTPATIENT
Start: 2018-10-26

## 2018-10-26 ASSESSMENT — ANXIETY QUESTIONNAIRES: GAD7 TOTAL SCORE: 15

## 2018-10-26 NOTE — TELEPHONE ENCOUNTER
Please review referral. Pt has [facility]  insurance.   Route back to reception pool # [id number].      Mohsen Maria

## 2018-10-26 NOTE — TELEPHONE ENCOUNTER
We can try wellbutrin as an alternative. Please call and inform patient strattera will not be covered. If patient does not wish to try wellbutrin, maintain follow up plan with addiction medicine.     -Jamie Oropeza, PaC

## 2018-10-26 NOTE — TELEPHONE ENCOUNTER
Please schedule appointment for patient with Addiction Medicine provider for polysubstance use.  Patient currently listed as Arthena.  If this is correct he would need to call [phone] for that Addiction med clinic.  If other insurance may schedule with us.

## 2018-10-26 NOTE — TELEPHONE ENCOUNTER
PRIOR AUTHORIZATION DENIED    Medication: atomoxetine (STRATTERA) 40 MG capsule - DENIED     Denial Date: 10/26/2018    Denial Rational: Romero from the insurance ([employer]) called to inform that the PA request cannot be approved. He stated that we are [place] for this class of medication and that the patient will need to find an in-network provider. Once this is done they can contact Medical Review to get the prescription approved.     Appeal Information: An appeal is not appropriate since we are an out-of-network provider; the patient will have to call his plan to figure out who would be an in-network provider.

## 2018-10-26 NOTE — TELEPHONE ENCOUNTER
Writer attempted to reach pt; no answer. LVM for a call back to discuss insurance and schedule an appt. Two more attempts will be made.      Mohsen Maria

## 2018-10-26 NOTE — TELEPHONE ENCOUNTER
PRIOR AUTHORIZATION DENIED    Medication: ARIPiprazole (ABILIFY) 5 MG tablet - DENIED     Denial Date: 10/26/2018    Denial Rational: Romero from the insurance ([employer]) called to inform us that they will not approved the 5 mg tablets once daily. This medication will be covered if you switch to the 10 mg tablets and take 1/2 tablet daily. If this will work, please send a new script to the pharmacy so that they can process it for the patient.    Appeal Information: An appeal is not appropriate for this request and will not be considered for approval; no appeal information provided.

## 2018-10-26 NOTE — TELEPHONE ENCOUNTER
This PA request was submitted to the insurance by the Central Prior Authorization Team.  If you have any questions in regards to this request, we can be reached at [phone].     Thanks    PA Initiation    Medication: atomoxetine (STRATTERA) 40 MG capsule  Insurance Company: [employer] - Phone [phone] Fax [phone]  Pharmacy Filling the Rx: [place], [place] - [address]  Filling Pharmacy Phone: [phone]  Filling Pharmacy Fax:    Start Date: 10/26/2018

## 2018-10-26 NOTE — TELEPHONE ENCOUNTER
This PA request was submitted to the insurance by the Central Prior Authorization Team.  If you have any questions in regards to this request, we can be reached at [phone].     Thanks    PA Initiation    Medication: ARIPiprazole (ABILIFY) 5 MG tablet  Insurance Company: [employer] - Phone [phone] Fax [phone]  Pharmacy Filling the Rx: [place], [place] - [address]  Filling Pharmacy Phone: [phone]  Filling Pharmacy Fax:    Start Date: 10/26/2018

## 2018-10-26 NOTE — TELEPHONE ENCOUNTER
Please start PA.     Strattera 40 mg- History of substance abuse. Stimulants not appropriate for add management for this patient.     Abilify- failed monotherapy of cymbalta as well as amitriptyline, Effexor, and lexapro.     -Jamie Oropeza, PAC

## 2018-10-29 RX ORDER — BUPROPION HYDROCHLORIDE 150 MG/1
150 TABLET ORAL EVERY MORNING
Qty: 30 TABLET | Refills: 1 | Status: SHIPPED | OUTPATIENT
Start: 2018-10-29

## 2018-10-29 NOTE — TELEPHONE ENCOUNTER
Pt calls, informed, wanted to know diagnosis for straterra, informed att def, informed of denial since out of network, pt thinks has to go to [facility] if wants rx, accepts wellbutrin rx, routed to B, please send rx    Romero from the insurance ([employer]) called to inform that the PA request cannot be approved. He stated that we are [place] for this class of medication and that the patient will need to find an in-network provider.  Dorene Swain RN, BSN  Message handled by Nurse Triage.

## 2018-11-06 NOTE — TELEPHONE ENCOUNTER
Second attempt to reach pt; no answer. LVM requesting a call back for an appt. A final attempt will be made.     Mohsen Maria

## 2018-11-12 NOTE — TELEPHONE ENCOUNTER
Third and final attempt to reach pt; no answer. LVM requesting a call back for an appt.   Writer is routing this encounter to referring provider. Please inform pt that we tried to reach him to get an appt scheduled with an addiction medicine provider. Please have pt call us back if he is still interested in being seen at [facility]. [phone]. Writer is closing this encounter, no further action needed.     Thank you,  Mohsen Maria      [facility]

## 2019-05-23 ENCOUNTER — TRANSFERRED RECORDS (OUTPATIENT)
Dept: HEALTH INFORMATION MANAGEMENT | Facility: CLINIC | Age: 49
End: 2019-05-23

## 2020-12-29 NOTE — TELEPHONE ENCOUNTER
[phone] left message to call back   Zolpidem Rx is in Jamie's office IN-BASKET  Favio Brannon, RN    
L/M to call.  Abida Awan RN    
Left message. Third and final reminder to call clinic regarding a prescription.     Jamie, Patient is not returning our calls. Please advise what we are to do with zolpidem hard copy in your office?   Favio Brannon RN    
Letter mailed. Zolpidem Rx shredded.   Favio Brannon, RN    
Pending Prescriptions:                       Disp   Refills    zolpidem (AMBIEN) 10 MG tablet            30 tab*0            Sig: Take 0.5-1 tablets (5-10 mg) by mouth nightly as           needed for sleep    Controlled Substance Refill Request for   Problem List Complete:  Yes  Controlled Substance Refill Request for Ambien      Last refill: 3/10/17     Last clinic visit: 3/10/17      Clinic visit frequency required: Q 6  months  Next appt: TBD     Controlled substance agreement on file: Yes:  Date 3/10/17.     Documentation in problem list reviewed:  Yes     Processing:  Staff will hand deliver Rx to on-site pharmacy     [place] reviewed:  reviewed 7.12.17, controlled meds from 3 providers  [place]:  [email or web address]          checked in past 6 months?  Yes 07/12/2017    Pancho MUNIZ  
Recommending destroying hard copy and send letter.     Thanks,    Jamie Oropeza, PAC  
Refilled in inbox, but will hold for now until can call patient. Personally reviewed today. Recent clonazepam and adderall prescribed through psych. Please call patient and ask what clonazepam is taken for through psych?       If due for anxiety, will refill but will not recommend taking within 12 hours of ambien due to potential interaction of respiratory depression.     If for sleep, will no longer prescribe ambien while taking. These should not be taken at the same time.     Thanks,    Jamie Oropeza, PAC  
Routing refill request to provider for review/approval because:  Drug not on the [facility] refill protocol   Last fill of Ambien: 5.17.17    : 12.14.17, system wouldn't print report  Rx's from 3 other providers for clonazepam, Adderall 20 mg and Adderall ER, hydrocodone and oxycodone    Zoë Diehl RN, BS  Clinical Nurse Triage.      
36.8

## 2023-09-25 ENCOUNTER — TELEPHONE (OUTPATIENT)
Dept: FAMILY MEDICINE | Facility: CLINIC | Age: 53
End: 2023-09-25
Payer: COMMERCIAL

## 2023-09-25 NOTE — TELEPHONE ENCOUNTER
Pt calling, informs he needs forms filled out for his sober living house.     Pt informs he has been living in a sober house in [facility] but he is supposed to be out of house tomorrow. He is attempting to move into a sober house in [facility] and needs a provider to fill out forms.     Pt was last seen in primary care at [facility] in 2018 with Jamie Oropeza PA-C. Informed pt he is no longer an established patient with [facility] and will need an establish care visit. Patient was given an opportunity to ask questions, verbalized understanding of plan, and is agreeable.    Scheduled pt with Martha Burrell PA-C for 9/27/23 to fill out forms. Scheduled pt for VV with Jamie Oropeza PA-C to discuss re-establishing care.      Anita ANDERSEN RN]